# Patient Record
Sex: MALE | Race: WHITE | NOT HISPANIC OR LATINO | Employment: FULL TIME | URBAN - METROPOLITAN AREA
[De-identification: names, ages, dates, MRNs, and addresses within clinical notes are randomized per-mention and may not be internally consistent; named-entity substitution may affect disease eponyms.]

---

## 2017-01-19 ENCOUNTER — NON-PROVIDER VISIT (OUTPATIENT)
Dept: URGENT CARE | Facility: CLINIC | Age: 27
End: 2017-01-19

## 2017-01-19 DIAGNOSIS — Z02.1 PRE-EMPLOYMENT DRUG SCREENING: ICD-10-CM

## 2017-01-19 LAB
AMP AMPHETAMINE: NORMAL
COC COCAINE: NORMAL
INT CON NEG: NORMAL
INT CON POS: NORMAL
OPI OPIATES: NORMAL
PCP PHENCYCLIDINE: NORMAL
POC DRUG COMMENT 753798-OCCUPATIONAL HEALTH: NEGATIVE
THC: NORMAL

## 2017-01-19 PROCEDURE — 80300 POCT 5 PANEL URINE DRUG SCREEN - INSTANT: CPT | Performed by: NURSE PRACTITIONER

## 2018-03-20 ENCOUNTER — OFFICE VISIT (OUTPATIENT)
Dept: URGENT CARE | Facility: PHYSICIAN GROUP | Age: 28
End: 2018-03-20

## 2018-03-20 ENCOUNTER — HOSPITAL ENCOUNTER (OUTPATIENT)
Facility: MEDICAL CENTER | Age: 28
End: 2018-03-20
Attending: EMERGENCY MEDICINE

## 2018-03-20 ENCOUNTER — TELEPHONE (OUTPATIENT)
Dept: URGENT CARE | Facility: PHYSICIAN GROUP | Age: 28
End: 2018-03-20

## 2018-03-20 VITALS
HEART RATE: 76 BPM | TEMPERATURE: 98.4 F | RESPIRATION RATE: 16 BRPM | DIASTOLIC BLOOD PRESSURE: 80 MMHG | SYSTOLIC BLOOD PRESSURE: 122 MMHG | WEIGHT: 230 LBS | OXYGEN SATURATION: 100 %

## 2018-03-20 DIAGNOSIS — R21 RASH: ICD-10-CM

## 2018-03-20 PROCEDURE — 99203 OFFICE O/P NEW LOW 30 MIN: CPT | Performed by: EMERGENCY MEDICINE

## 2018-03-20 RX ORDER — TRIAMCINOLONE ACETONIDE 1 MG/G
1 CREAM TOPICAL 2 TIMES DAILY
Qty: 1 TUBE | Refills: 1 | Status: SHIPPED | OUTPATIENT
Start: 2018-03-20 | End: 2018-11-02

## 2018-03-20 ASSESSMENT — ENCOUNTER SYMPTOMS
MYALGIAS: 0
EYE REDNESS: 0
CHILLS: 0
VOMITING: 0
SENSORY CHANGE: 0
TREMORS: 0
EYE DISCHARGE: 0
TINGLING: 0
NAUSEA: 0
ABDOMINAL PAIN: 0
FEVER: 0

## 2018-03-20 NOTE — PROGRESS NOTES
Subjective:      Kal Cortes is a 28 y.o. male who presents with Rash (On scrotum w/ numbness x 1 day ) and Rash (bi-lateral legs )            HPI  Patient is a pleasant 28-year-old male complaining of right shoulder rash for the past. Rash is very dry scaly patient also has an ongoing rash on his legs and arms that are dry and scaly primarily on his extremities. Patient denies any exposure to STD. His scrotal lesion are not vesicular or weeping but    have dried out his right scrotum.  No Known Allergies  Social History     Social History   • Marital status:      Spouse name: N/A   • Number of children: N/A   • Years of education: N/A     Occupational History   • Not on file.     Social History Main Topics   • Smoking status: Never Smoker   • Smokeless tobacco: Never Used   • Alcohol use Not on file   • Drug use: Unknown   • Sexual activity: Not on file     Other Topics Concern   • Not on file     Social History Narrative   • No narrative on file   History reviewed. No pertinent past medical history..  No current outpatient prescriptions on file prior to visit.     No current facility-administered medications on file prior to visit.    No family history on file.      Review of Systems   Constitutional: Negative for chills and fever.   Eyes: Negative for discharge and redness.   Cardiovascular: Negative for chest pain.   Gastrointestinal: Negative for abdominal pain, nausea and vomiting.   Musculoskeletal: Negative for myalgias.   Skin: Positive for rash.        Patient has a dry scaly rash on the right side of his scrotum approximately 5 x 5 cm no vesicular lesions, there is no repeating with the rash.    Patient also has other 2.2 cm dry scaly lesions on his arms and legs. He's had been present on and off for years.   Neurological: Negative for tingling, tremors and sensory change.          Objective:     /80   Pulse 76   Temp 36.9 °C (98.4 °F)   Resp 16   Wt 104.3 kg (230 lb)   SpO2  100%      Physical Exam   Constitutional: He appears well-developed and well-nourished. No distress.   HENT:   Head: Normocephalic and atraumatic.   Right Ear: External ear normal.   Left Ear: External ear normal.   Eyes: Conjunctivae are normal.   Pulmonary/Chest: Effort normal.   Abdominal: There is no tenderness.   Musculoskeletal: Normal range of motion. He exhibits no edema or tenderness.   Neurological: He is alert.   Skin: Skin is warm and dry. Rash noted. He is not diaphoretic. No erythema.   Patient has a number of lesions on his extremities are dry.  Vesicular. His nose lesion is a 5 cm area thickened scrotal skin with flakiness no vesicles no herpetic lesions. Review   Psychiatric: He has a normal mood and affect. His behavior is normal.   Nursing note and vitals reviewed.              Assessment/Plan:     1. Rash      I have sent off a KOH and I will call the patient he will initiate Kenalog on one side of his body with thin layer placed twice a day to see if he gets any improvement. Given a referral to dermatology which he notices always HAVE recommended he consider using [facility] and that there will be awaiting time which seems to be less than the dermatologist in the local area.  - KOH PREP; Future  - REFERRAL TO DERMATOLOGY  - triamcinolone acetonide (KENALOG) 0.1 % Cream; Apply 1 Application to affected area(s) 2 times a day.  Dispense: 1 Tube; Refill: 1

## 2018-03-21 ENCOUNTER — TELEPHONE (OUTPATIENT)
Dept: URGENT CARE | Facility: PHYSICIAN GROUP | Age: 28
End: 2018-03-21

## 2018-03-21 LAB
KOH PREP SPEC: NORMAL
SIGNIFICANT IND 70042: NORMAL
SITE SITE: NORMAL
SOURCE SOURCE: NORMAL

## 2018-07-12 ENCOUNTER — HOSPITAL ENCOUNTER (OUTPATIENT)
Dept: RADIOLOGY | Facility: MEDICAL CENTER | Age: 28
End: 2018-07-12
Attending: FAMILY MEDICINE
Payer: COMMERCIAL

## 2018-07-12 ENCOUNTER — OFFICE VISIT (OUTPATIENT)
Dept: MEDICAL GROUP | Age: 28
End: 2018-07-12
Payer: COMMERCIAL

## 2018-07-12 VITALS
SYSTOLIC BLOOD PRESSURE: 126 MMHG | HEIGHT: 74 IN | TEMPERATURE: 98.6 F | HEART RATE: 81 BPM | DIASTOLIC BLOOD PRESSURE: 88 MMHG | OXYGEN SATURATION: 94 % | BODY MASS INDEX: 29.39 KG/M2 | WEIGHT: 229 LBS

## 2018-07-12 DIAGNOSIS — E66.3 OVERWEIGHT (BMI 25.0-29.9): ICD-10-CM

## 2018-07-12 DIAGNOSIS — G89.29 CHRONIC LOW BACK PAIN WITH SCIATICA, SCIATICA LATERALITY UNSPECIFIED, UNSPECIFIED BACK PAIN LATERALITY: ICD-10-CM

## 2018-07-12 DIAGNOSIS — M54.40 CHRONIC LOW BACK PAIN WITH SCIATICA, SCIATICA LATERALITY UNSPECIFIED, UNSPECIFIED BACK PAIN LATERALITY: ICD-10-CM

## 2018-07-12 DIAGNOSIS — Z00.00 PREVENTATIVE HEALTH CARE: ICD-10-CM

## 2018-07-12 PROBLEM — M54.9 CHRONIC BACK PAIN: Status: ACTIVE | Noted: 2018-07-12

## 2018-07-12 PROCEDURE — 99204 OFFICE O/P NEW MOD 45 MIN: CPT | Performed by: FAMILY MEDICINE

## 2018-07-12 PROCEDURE — 72100 X-RAY EXAM L-S SPINE 2/3 VWS: CPT

## 2018-07-12 ASSESSMENT — PATIENT HEALTH QUESTIONNAIRE - PHQ9: CLINICAL INTERPRETATION OF PHQ2 SCORE: 0

## 2018-07-12 NOTE — ASSESSMENT & PLAN NOTE
The patient is a 28-year-old male who presents to clinic to establish care, he has significant past medical history of being overweight and chronic back pain.  The patient denied any chest pain, no sob, no biggs, no  pnd, no orthopnea, no headache, no changes in vision, no numbness or tingling, no nausea, no diarrhea, no abdominal pain, no fevers, no chills, no bright red blood per rectum, no  difficulty urinating, no burning during micturition, no depressed mood, no other concerns.    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

## 2018-07-12 NOTE — ASSESSMENT & PLAN NOTE
Patient states he has had low back pain/thoracic back pain since age 13.    He has never had any surgeries, has not had any plain films or MRIs.  He has been using Advil as needed.  He states the last 3 months the pain has become more persistent, 5 out of 10 sharp radiating down his right gluteus rudi and right leg.  The pain improved by laying on flat ground, as well as sitting in a stiff chair, soft Sophos worsen the pain.  He states that when he is driving he needs to use for lumbar support.  He denies any loss of bladder or bowel function no numbness or tingling.

## 2018-07-13 NOTE — ASSESSMENT & PLAN NOTE
Patient knows he can improve with his diet he has difficulty being active due to the back pain.  He can swim however does not have easy access to a swimming pool.

## 2018-07-13 NOTE — PROGRESS NOTES
This medical record contains text that has been entered with the assistance of computer voice recognition and dictation software.  Therefore, it may contain unintended errors in text, spelling, punctuation, or grammar    Chief Complaint   Patient presents with   • Establish Care   • Back Pain     low back pain, x3 months         Kal Cortes is a 28 y.o. male here evaluation and management of: Establish care, back pain, obesity      HPI:     Preventative health care  The patient is a 28-year-old male who presents to clinic to [facility], he has significant past medical history of being overweight and chronic back pain.  The patient denied any chest pain, no sob, no biggs, no  pnd, no orthopnea, no headache, no changes in vision, no numbness or tingling, no nausea, no diarrhea, no abdominal pain, no fevers, no chills, no bright red blood per rectum, no  difficulty urinating, no burning during micturition, no depressed mood, no other concerns.    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    He has never had any surgeries, has not had any plain films or MRIs.  He has been using Advil as needed.  He states the last 3 months the pain has become more persistent, 5 out of 10 sharp radiating down his right gluteus rudi and right leg.  The pain improved by laying on flat ground, as well as sitting in a stiff chair, soft Sophos worsen the pain.  He states that when he is driving he needs to use for lumbar support.  He denies any loss of bladder or bowel function no numbness or tingling.      Overweight (BMI 25.0-29.9)  Patient knows he can improve with his diet he has difficulty being active due to the back pain.  He can  "swim however does not have easy access to a swimming pool.    Current medicines (including changes today)  Current Outpatient Prescriptions   Medication Sig Dispense Refill   • Loratadine-Pseudoephedrine (CLARITIN-D 24 HOUR PO) Take  by mouth.     • triamcinolone acetonide (KENALOG) 0.1 % Cream Apply 1 Application to affected area(s) 2 times a day. 1 Tube 1     No current facility-administered medications for this visit.      He  has no past medical history on file.  He  has no past surgical history on file.  Social History   Substance Use Topics   • Smoking status: Never Smoker   • Smokeless tobacco: Never Used   • Alcohol use No     Social History     Social History Narrative   • No narrative on file     No family history on file.  No family status information on file.         ROS    Please see hpi     All other systems reviewed and are negative     Objective:     Blood pressure 126/88, pulse 81, temperature 37 °C (98.6 °F), height 1.88 m (6' 2\"), weight 103.9 kg (229 lb), SpO2 94 %. Body mass index is 29.4 kg/m².  Physical Exam:    Constitutional: Alert, no distress.  Skin: Warm, dry, good turgor, no rashes in visible areas.  Eye: Equal, round and reactive, conjunctiva clear, lids normal.  ENMT: Lips without lesions, good dentition, oropharynx clear.  Neck: Trachea midline, no masses, no thyromegaly. No cervical or supraclavicular lymphadenopathy.  Respiratory: Unlabored respiratory effort, lungs clear to auscultation, no wheezes, no ronchi.  Cardiovascular: Normal S1, S2, no murmur, no edema.  Abdomen: Soft, non-tender, no masses, no hepatosplenomegaly.  Psych: Alert and oriented x3, normal affect and mood.          Assessment and Plan:   The following treatment plan was discussed      1. Preventative health care    Care has been established  We need baseline labs to establish a clinical profile  We reviewed USPSTF guidelines    Requested Medical records to be sent to us  Denies intimate partner " shonda        2. Chronic low back pain with sciatica, sciatica laterality unspecified, unspecified back pain laterality    Patient informed that overall prognosis of back pain is favorable, he is to use ICE x 2 days, then switch to heat,  Nsaids,  and to ease back into normal activity as quickly as possible,  also to use proper lifting techniques (use legs not back), no clinical indication for imaging. Also counseled patient on low back stretching, hamstring stretching, core strengthening once no longer in acute pain.   - DX-LUMBAR SPINE-2 OR 3 VIEWS; Future  - URINALYSIS,CULTURE IF INDICATED; Future    3. Overweight (BMI 25.0-29.9)      We discussed subsequent randomized trials, weight loss (via lifestyle or pharmacologic therapy) has been shown to reduce morbidity (reduction in risk factors for cardiovascular disease     The frequently cited Diabetes Prevention Program (DPP) significantly reduced the rate of progression from impaired glucose tolerance to diabetes over a three-year period in participants randomized to intensive lifestyle modification focusing on weight loss       We also discussed agressive lifestyle modifications with weight loss, aerobic exercise, and eating a prudent diet, which  included avoiding fried foods, using low-fat milk and avoiding simple carbohydrate, making a food diary. If you can't run because of pain do the stationary bike/elipticle or swim 30minutes 3 times per wk, in the beginning and then gradually increase.    - LIPID PROFILE; Future  - CBC WITHOUT DIFFERENTIAL; Future  - COMP METABOLIC PANEL; Future        HEALTH MAINTENANCE:    Instructed to Follow up in clinic or ER for worsening symptoms, difficulty breathing, lack of expected recovery, or should new symptoms or problems arise.    Followup: Return in about 3 months (around 10/12/2018) for Reevaluation, labs.       Once again this medical record contains text that has been entered with the assistance of computer voice  recognition and dictation software.  Therefore, it may contain unintended errors in text, spelling, punctuation, or grammar

## 2018-07-23 ENCOUNTER — HOSPITAL ENCOUNTER (OUTPATIENT)
Dept: LAB | Facility: MEDICAL CENTER | Age: 28
End: 2018-07-23
Attending: FAMILY MEDICINE
Payer: COMMERCIAL

## 2018-07-23 DIAGNOSIS — G89.29 CHRONIC LOW BACK PAIN WITH SCIATICA, SCIATICA LATERALITY UNSPECIFIED, UNSPECIFIED BACK PAIN LATERALITY: ICD-10-CM

## 2018-07-23 DIAGNOSIS — M54.40 CHRONIC LOW BACK PAIN WITH SCIATICA, SCIATICA LATERALITY UNSPECIFIED, UNSPECIFIED BACK PAIN LATERALITY: ICD-10-CM

## 2018-07-23 DIAGNOSIS — E66.3 OVERWEIGHT (BMI 25.0-29.9): ICD-10-CM

## 2018-07-23 LAB
ALBUMIN SERPL BCP-MCNC: 5 G/DL (ref 3.2–4.9)
ALBUMIN/GLOB SERPL: 1.7 G/DL
ALP SERPL-CCNC: 58 U/L (ref 30–99)
ALT SERPL-CCNC: 29 U/L (ref 2–50)
ANION GAP SERPL CALC-SCNC: 7 MMOL/L (ref 0–11.9)
APPEARANCE UR: CLEAR
AST SERPL-CCNC: 31 U/L (ref 12–45)
BILIRUB SERPL-MCNC: 1 MG/DL (ref 0.1–1.5)
BILIRUB UR QL STRIP.AUTO: NEGATIVE
BUN SERPL-MCNC: 13 MG/DL (ref 8–22)
CALCIUM SERPL-MCNC: 9.9 MG/DL (ref 8.5–10.5)
CHLORIDE SERPL-SCNC: 103 MMOL/L (ref 96–112)
CHOLEST SERPL-MCNC: 151 MG/DL (ref 100–199)
CO2 SERPL-SCNC: 28 MMOL/L (ref 20–33)
COLOR UR: YELLOW
CREAT SERPL-MCNC: 1.02 MG/DL (ref 0.5–1.4)
ERYTHROCYTE [DISTWIDTH] IN BLOOD BY AUTOMATED COUNT: 35.7 FL (ref 35.9–50)
GLOBULIN SER CALC-MCNC: 2.9 G/DL (ref 1.9–3.5)
GLUCOSE SERPL-MCNC: 81 MG/DL (ref 65–99)
GLUCOSE UR STRIP.AUTO-MCNC: NEGATIVE MG/DL
HCT VFR BLD AUTO: 49.7 % (ref 42–52)
HDLC SERPL-MCNC: 38 MG/DL
HGB BLD-MCNC: 17.7 G/DL (ref 14–18)
KETONES UR STRIP.AUTO-MCNC: NEGATIVE MG/DL
LDLC SERPL CALC-MCNC: 75 MG/DL
LEUKOCYTE ESTERASE UR QL STRIP.AUTO: NEGATIVE
MCH RBC QN AUTO: 30.1 PG (ref 27–33)
MCHC RBC AUTO-ENTMCNC: 35.6 G/DL (ref 33.7–35.3)
MCV RBC AUTO: 84.5 FL (ref 81.4–97.8)
MICRO URNS: NORMAL
NITRITE UR QL STRIP.AUTO: NEGATIVE
PH UR STRIP.AUTO: 6 [PH]
PLATELET # BLD AUTO: 226 K/UL (ref 164–446)
PMV BLD AUTO: 10.7 FL (ref 9–12.9)
POTASSIUM SERPL-SCNC: 3.6 MMOL/L (ref 3.6–5.5)
PROT SERPL-MCNC: 7.9 G/DL (ref 6–8.2)
PROT UR QL STRIP: NEGATIVE MG/DL
RBC # BLD AUTO: 5.88 M/UL (ref 4.7–6.1)
RBC UR QL AUTO: NEGATIVE
SODIUM SERPL-SCNC: 138 MMOL/L (ref 135–145)
SP GR UR STRIP.AUTO: 1.01
TRIGL SERPL-MCNC: 192 MG/DL (ref 0–149)
UROBILINOGEN UR STRIP.AUTO-MCNC: 0.2 MG/DL
WBC # BLD AUTO: 6.6 K/UL (ref 4.8–10.8)

## 2018-07-23 PROCEDURE — 80053 COMPREHEN METABOLIC PANEL: CPT

## 2018-07-23 PROCEDURE — 80061 LIPID PANEL: CPT

## 2018-07-23 PROCEDURE — 85027 COMPLETE CBC AUTOMATED: CPT

## 2018-07-23 PROCEDURE — 36415 COLL VENOUS BLD VENIPUNCTURE: CPT

## 2018-07-23 PROCEDURE — 81003 URINALYSIS AUTO W/O SCOPE: CPT

## 2018-08-01 DIAGNOSIS — M48.9 SPONDYLOPATHY: ICD-10-CM

## 2018-08-14 ENCOUNTER — HOSPITAL ENCOUNTER (OUTPATIENT)
Dept: RADIOLOGY | Facility: MEDICAL CENTER | Age: 28
End: 2018-08-14
Attending: PHYSICIAN ASSISTANT
Payer: COMMERCIAL

## 2018-08-14 DIAGNOSIS — M54.5 LOW BACK PAIN, UNSPECIFIED BACK PAIN LATERALITY, UNSPECIFIED CHRONICITY, WITH SCIATICA PRESENCE UNSPECIFIED: ICD-10-CM

## 2018-08-14 DIAGNOSIS — M54.12 RADICULOPATHY, CERVICAL: ICD-10-CM

## 2018-08-14 PROCEDURE — 72050 X-RAY EXAM NECK SPINE 4/5VWS: CPT

## 2018-08-14 PROCEDURE — 72110 X-RAY EXAM L-2 SPINE 4/>VWS: CPT

## 2018-08-23 ENCOUNTER — HOSPITAL ENCOUNTER (OUTPATIENT)
Dept: RADIOLOGY | Facility: MEDICAL CENTER | Age: 28
End: 2018-08-23
Attending: PHYSICIAN ASSISTANT
Payer: COMMERCIAL

## 2018-08-23 DIAGNOSIS — M54.5 LOW BACK PAIN, UNSPECIFIED BACK PAIN LATERALITY, UNSPECIFIED CHRONICITY, WITH SCIATICA PRESENCE UNSPECIFIED: ICD-10-CM

## 2018-08-23 DIAGNOSIS — M54.2 CERVICALGIA: ICD-10-CM

## 2018-08-23 PROCEDURE — 72148 MRI LUMBAR SPINE W/O DYE: CPT

## 2018-08-23 PROCEDURE — 72141 MRI NECK SPINE W/O DYE: CPT

## 2018-11-02 ENCOUNTER — HOSPITAL ENCOUNTER (EMERGENCY)
Facility: MEDICAL CENTER | Age: 28
End: 2018-11-02
Attending: EMERGENCY MEDICINE
Payer: COMMERCIAL

## 2018-11-02 ENCOUNTER — OFFICE VISIT (OUTPATIENT)
Dept: URGENT CARE | Facility: PHYSICIAN GROUP | Age: 28
End: 2018-11-02
Payer: COMMERCIAL

## 2018-11-02 VITALS
DIASTOLIC BLOOD PRESSURE: 89 MMHG | OXYGEN SATURATION: 96 % | BODY MASS INDEX: 29.28 KG/M2 | SYSTOLIC BLOOD PRESSURE: 143 MMHG | WEIGHT: 228.18 LBS | TEMPERATURE: 99.5 F | RESPIRATION RATE: 16 BRPM | HEART RATE: 84 BPM | HEIGHT: 74 IN

## 2018-11-02 VITALS
DIASTOLIC BLOOD PRESSURE: 80 MMHG | OXYGEN SATURATION: 98 % | HEART RATE: 93 BPM | HEIGHT: 75 IN | WEIGHT: 225 LBS | BODY MASS INDEX: 27.98 KG/M2 | RESPIRATION RATE: 18 BRPM | SYSTOLIC BLOOD PRESSURE: 134 MMHG | TEMPERATURE: 101.2 F

## 2018-11-02 DIAGNOSIS — R52 BODY ACHES: ICD-10-CM

## 2018-11-02 DIAGNOSIS — R50.9 FEVER, UNSPECIFIED FEVER CAUSE: ICD-10-CM

## 2018-11-02 DIAGNOSIS — M62.81 MUSCULAR WEAKNESS: ICD-10-CM

## 2018-11-02 DIAGNOSIS — M60.9 MYOSITIS OF MULTIPLE SITES, UNSPECIFIED MYOSITIS TYPE: ICD-10-CM

## 2018-11-02 DIAGNOSIS — M79.10 MYALGIA: ICD-10-CM

## 2018-11-02 DIAGNOSIS — E87.6 HYPOKALEMIA: ICD-10-CM

## 2018-11-02 LAB
ALBUMIN SERPL BCP-MCNC: 4.4 G/DL (ref 3.2–4.9)
ALBUMIN/GLOB SERPL: 1.5 G/DL
ALP SERPL-CCNC: 56 U/L (ref 30–99)
ALT SERPL-CCNC: 37 U/L (ref 2–50)
ANION GAP SERPL CALC-SCNC: 8 MMOL/L (ref 0–11.9)
APPEARANCE UR: CLEAR
AST SERPL-CCNC: 35 U/L (ref 12–45)
BASOPHILS # BLD AUTO: 0.4 % (ref 0–1.8)
BASOPHILS # BLD: 0.02 K/UL (ref 0–0.12)
BILIRUB SERPL-MCNC: 0.8 MG/DL (ref 0.1–1.5)
BILIRUB UR STRIP-MCNC: NORMAL MG/DL
BUN SERPL-MCNC: 17 MG/DL (ref 8–22)
CALCIUM SERPL-MCNC: 8.7 MG/DL (ref 8.4–10.2)
CHLORIDE SERPL-SCNC: 104 MMOL/L (ref 96–112)
CK SERPL-CCNC: 266 U/L (ref 0–154)
CO2 SERPL-SCNC: 25 MMOL/L (ref 20–33)
COLOR UR AUTO: YELLOW
CREAT SERPL-MCNC: 1.08 MG/DL (ref 0.5–1.4)
CRP SERPL HS-MCNC: 2.07 MG/DL (ref 0–0.75)
EOSINOPHIL # BLD AUTO: 0.01 K/UL (ref 0–0.51)
EOSINOPHIL NFR BLD: 0.2 % (ref 0–6.9)
ERYTHROCYTE [DISTWIDTH] IN BLOOD BY AUTOMATED COUNT: 35.1 FL (ref 35.9–50)
ERYTHROCYTE [SEDIMENTATION RATE] IN BLOOD BY WESTERGREN METHOD: 6 MM/HOUR (ref 0–15)
FLUAV+FLUBV AG SPEC QL IA: NEGATIVE
GLOBULIN SER CALC-MCNC: 3 G/DL (ref 1.9–3.5)
GLUCOSE SERPL-MCNC: 96 MG/DL (ref 65–99)
GLUCOSE UR STRIP.AUTO-MCNC: NORMAL MG/DL
HCT VFR BLD AUTO: 46.8 % (ref 42–52)
HETEROPH AB SER QL: NEGATIVE
HGB BLD-MCNC: 16.8 G/DL (ref 14–18)
IMM GRANULOCYTES # BLD AUTO: 0.02 K/UL (ref 0–0.11)
IMM GRANULOCYTES NFR BLD AUTO: 0.4 % (ref 0–0.9)
INT CON NEG: NEGATIVE
INT CON NEG: NEGATIVE
INT CON POS: POSITIVE
INT CON POS: POSITIVE
KETONES UR STRIP.AUTO-MCNC: NORMAL MG/DL
LEUKOCYTE ESTERASE UR QL STRIP.AUTO: NORMAL
LYMPHOCYTES # BLD AUTO: 1.95 K/UL (ref 1–4.8)
LYMPHOCYTES NFR BLD: 36.5 % (ref 22–41)
MCH RBC QN AUTO: 29.7 PG (ref 27–33)
MCHC RBC AUTO-ENTMCNC: 35.9 G/DL (ref 33.7–35.3)
MCV RBC AUTO: 82.8 FL (ref 81.4–97.8)
MONOCYTES # BLD AUTO: 0.62 K/UL (ref 0–0.85)
MONOCYTES NFR BLD AUTO: 11.6 % (ref 0–13.4)
NEUTROPHILS # BLD AUTO: 2.72 K/UL (ref 1.82–7.42)
NEUTROPHILS NFR BLD: 50.9 % (ref 44–72)
NITRITE UR QL STRIP.AUTO: NORMAL
NRBC # BLD AUTO: 0 K/UL
NRBC BLD-RTO: 0 /100 WBC
PH UR STRIP.AUTO: 6 [PH] (ref 5–8)
PLATELET # BLD AUTO: 165 K/UL (ref 164–446)
PMV BLD AUTO: 9.6 FL (ref 9–12.9)
POTASSIUM SERPL-SCNC: 3.3 MMOL/L (ref 3.6–5.5)
PROT SERPL-MCNC: 7.4 G/DL (ref 6–8.2)
PROT UR QL STRIP: NORMAL MG/DL
RBC # BLD AUTO: 5.65 M/UL (ref 4.7–6.1)
RBC UR QL AUTO: NORMAL
S PYO AG THROAT QL: NEGATIVE
SODIUM SERPL-SCNC: 137 MMOL/L (ref 135–145)
SP GR UR STRIP.AUTO: 1.01
TSH SERPL DL<=0.005 MIU/L-ACNC: 2.67 UIU/ML (ref 0.38–5.33)
UROBILINOGEN UR STRIP-MCNC: 0.2 MG/DL
WBC # BLD AUTO: 5.3 K/UL (ref 4.8–10.8)

## 2018-11-02 PROCEDURE — 87040 BLOOD CULTURE FOR BACTERIA: CPT

## 2018-11-02 PROCEDURE — 80053 COMPREHEN METABOLIC PANEL: CPT

## 2018-11-02 PROCEDURE — 86618 LYME DISEASE ANTIBODY: CPT

## 2018-11-02 PROCEDURE — 87804 INFLUENZA ASSAY W/OPTIC: CPT | Performed by: PHYSICIAN ASSISTANT

## 2018-11-02 PROCEDURE — 86431 RHEUMATOID FACTOR QUANT: CPT

## 2018-11-02 PROCEDURE — 99214 OFFICE O/P EST MOD 30 MIN: CPT | Performed by: PHYSICIAN ASSISTANT

## 2018-11-02 PROCEDURE — 81002 URINALYSIS NONAUTO W/O SCOPE: CPT | Performed by: PHYSICIAN ASSISTANT

## 2018-11-02 PROCEDURE — 82550 ASSAY OF CK (CPK): CPT

## 2018-11-02 PROCEDURE — 85652 RBC SED RATE AUTOMATED: CPT

## 2018-11-02 PROCEDURE — 99284 EMERGENCY DEPT VISIT MOD MDM: CPT

## 2018-11-02 PROCEDURE — 84443 ASSAY THYROID STIM HORMONE: CPT

## 2018-11-02 PROCEDURE — 85025 COMPLETE CBC W/AUTO DIFF WBC: CPT

## 2018-11-02 PROCEDURE — 86308 HETEROPHILE ANTIBODY SCREEN: CPT

## 2018-11-02 PROCEDURE — 87880 STREP A ASSAY W/OPTIC: CPT | Performed by: PHYSICIAN ASSISTANT

## 2018-11-02 PROCEDURE — 86140 C-REACTIVE PROTEIN: CPT

## 2018-11-02 PROCEDURE — 36415 COLL VENOUS BLD VENIPUNCTURE: CPT

## 2018-11-02 ASSESSMENT — PAIN SCALES - GENERAL: PAINLEVEL_OUTOF10: 1

## 2018-11-03 LAB — RHEUMATOID FACT SER IA-ACNC: <10 IU/ML (ref 0–14)

## 2018-11-03 NOTE — ED PROVIDER NOTES
"ED Provider Note    CHIEF COMPLAINT  Chief Complaint   Patient presents with   • Generalized Body Aches     x 2 days   • Sent from Urgent Care       HPI  Kal Cortes is a 28 y.o. male with no significant past medical history who presents complaining of weakness and myalgias.    Patient states he awoke 2 days ago and had difficulty using his left hand.  He states it feels weak and stiff.  He has tried to hold objects such as a cup and his phone and has been unable to do so.  Gradually, he states that these same symptoms began affecting the right hand and he now has pain all over his body including his forearms, anterior neck muscles, bilateral hips, upper arms and legs.  He states he feels like his muscles are tight due to swelling but he does not see any external signs of swelling.  He has pain with walking.  Patient states \"it feels like the day after I worked out really hard\" but states he has not exercised in over 2 months secondary to low back pain for which she is undergoing physical therapy.    Patient has taken Tylenol Cold for the symptoms without relief.    Patient denies statin use, trauma, cough, urinary symptoms, chest pain, abdominal pain, weight loss, night sweats, chills, rash, recent travel, tick bites, headache, photophobia, neck stiffness.    Patient was seen at urgent care and told he had a fever of 101.5.  Patient did not receive antipyretics there.  They tested him for flu, strep, and blood in his urine which were all negative.      ALLERGIES  No Known Allergies    CURRENT MEDICATIONS  Tylenol flu and cold    PAST MEDICAL HISTORY   has a past medical history of Chronic back pain.    SURGICAL HISTORY   has a past surgical history that includes appendectomy.    SOCIAL HISTORY  Social History     Social History Main Topics   • Smoking status: Never Smoker   • Smokeless tobacco: Never Used   • Alcohol use No   • Drug use: Yes      Comment: Marijuana   • Sexual activity: Yes     Partners: " "Female       2 children  Patient used marijuana edible's for the first time last night    Family Hx:  Denies history of rheumatoid arthritis, gout, lupus, autoimmune diseases in general      REVIEW OF SYSTEMS  See HPI for further details.  All other systems are negative except as above in HPI.    PHYSICAL EXAM  VITAL SIGNS: /89   Pulse 83   Temp 37.6 °C (99.6 °F)   Resp 17   Ht 1.88 m (6' 2\")   Wt 103.5 kg (228 lb 2.8 oz)   SpO2 97%   BMI 29.30 kg/m²    General:  WDWN, nontoxic appearing in NAD; A+Ox3; V/S as above  Skin: warm and dry; good color; no rash  HEENT: NCAT; EOMs intact; PERRL; no scleral icterus; oropharynx clear  Neck: FROM; no meningismus, no LAD; no thyromegaly  Cardiovascular: Regular heart rate and rhythm.  No murmurs, rubs, or gallops; pulses 2+ bilaterally radially and DP areas  Lungs: Clear to auscultation with good air movement bilaterally.  No wheezes, rhonchi, or rales.   Abdomen: BS present; soft; NTND; no rebound, guarding, or rigidity.  No organomegaly or pulsatile mass  Extremities: MATAMOROS x 4; no e/o trauma; no palpable tenderness; no pedal edema; neg London's  Neurologic: CNs III-XII grossly intact; speech clear; distal sensation intact;  strength 4 out of 5 bilaterally; strength 5/5 UE/LEs; DTRs 2+ bilaterally in BR areas  Psychiatric: Appropriate affect, normal mood    LABS  Results for orders placed or performed during the hospital encounter of 11/02/18   CMP   Result Value Ref Range    Sodium 137 135 - 145 mmol/L    Potassium 3.3 (L) 3.6 - 5.5 mmol/L    Chloride 104 96 - 112 mmol/L    Co2 25 20 - 33 mmol/L    Anion Gap 8.0 0.0 - 11.9    Glucose 96 65 - 99 mg/dL    Bun 17 8 - 22 mg/dL    Creatinine 1.08 0.50 - 1.40 mg/dL    Calcium 8.7 8.4 - 10.2 mg/dL    AST(SGOT) 35 12 - 45 U/L    ALT(SGPT) 37 2 - 50 U/L    Alkaline Phosphatase 56 30 - 99 U/L    Total Bilirubin 0.8 0.1 - 1.5 mg/dL    Albumin 4.4 3.2 - 4.9 g/dL    Total Protein 7.4 6.0 - 8.2 g/dL    Globulin 3.0 " 1.9 - 3.5 g/dL    A-G Ratio 1.5 g/dL   CRP QUANTITIVE (NON-CARDIAC)   Result Value Ref Range    Stat C-Reactive Protein 2.07 (H) 0.00 - 0.75 mg/dL   CREATINE KINASE   Result Value Ref Range    CPK Total 266 (H) 0 - 154 U/L   MONONUCLEOSIS TEST QUAL   Result Value Ref Range    Heterophile Screen Negative Negative   ESTIMATED GFR   Result Value Ref Range    GFR If African American >60 >60 mL/min/1.73 m 2    GFR If Non African American >60 >60 mL/min/1.73 m 2   CBC WITH DIFFERENTIAL   Result Value Ref Range    WBC 5.3 4.8 - 10.8 K/uL    RBC 5.65 4.70 - 6.10 M/uL    Hemoglobin 16.8 14.0 - 18.0 g/dL    Hematocrit 46.8 42.0 - 52.0 %    MCV 82.8 81.4 - 97.8 fL    MCH 29.7 27.0 - 33.0 pg    MCHC 35.9 (H) 33.7 - 35.3 g/dL    RDW 35.1 (L) 35.9 - 50.0 fL    Platelet Count 165 164 - 446 K/uL    MPV 9.6 9.0 - 12.9 fL    Neutrophils-Polys 50.90 44.00 - 72.00 %    Lymphocytes 36.50 22.00 - 41.00 %    Monocytes 11.60 0.00 - 13.40 %    Eosinophils 0.20 0.00 - 6.90 %    Basophils 0.40 0.00 - 1.80 %    Immature Granulocytes 0.40 0.00 - 0.90 %    Nucleated RBC 0.00 /100 WBC    Neutrophils (Absolute) 2.72 1.82 - 7.42 K/uL    Lymphs (Absolute) 1.95 1.00 - 4.80 K/uL    Monos (Absolute) 0.62 0.00 - 0.85 K/uL    Eos (Absolute) 0.01 0.00 - 0.51 K/uL    Baso (Absolute) 0.02 0.00 - 0.12 K/uL    Immature Granulocytes (abs) 0.02 0.00 - 0.11 K/uL    NRBC (Absolute) 0.00 K/uL   WESTERGREN SED RATE   Result Value Ref Range    Sed Rate Westergren 6 0 - 15 mm/hour   TSH   Result Value Ref Range    TSH 2.670 0.380 - 5.330 uIU/mL       MEDICAL RECORD  I have reviewed patient's medical record and pertinent results are listed below.      COURSE & MEDICAL DECISION MAKING  I have reviewed any medical record information, laboratory studies and radiographic results as noted.    Kal Cortes is a 28 y.o. male who presents complaining of myalgias and fever.  He reports pain and tightness with gripping his left hand which has now migrated to the right  and also involves his hips and anterior neck.  No respiratory symptoms.  I do not suspect meningitis.  I do not appreciate weakness on my exam and DTRs are normal.  I do not suspect Guyon Barré or transverse myelitis.  I did discuss the possibility of an autoimmune process with the patient.    Labs demonstrate an elevated CPK of 266 and CRP of 2.07.  Patient has mild hypokalemia of 3.3.  Mono is negative.  Blood cultures, ESR, Lyme titer, and rheumatoid factor are pending.    Pt was re-evaluated at 10:32 PM  Patient was advised about his test results.  I suspect he has a myositis.  This is likely viral in origin.  I have advised him to take Tylenol and/or Motrin.  He should drink plenty of fluids.  I advised him to go to [facility] ER for worsening symptoms which would include increased weakness, difficulty breathing, dark urine, increased pain, or other concerns.  He understands there is a neurologist on-call there should one be needed.  The patient and his wife are amenable to this plan.    Pt's blood pressure was noted to be above 120/80 here in the ER.  Pt was informed and advised to follow up as an outpatient for recheck for possible dx/management of hypertension.    FINAL IMPRESSION  1. Myalgia    2. Myositis of multiple sites, unspecified myositis type    3. Hypokalemia      Electronically signed by: Meggan West, 11/2/2018 7:21 PM

## 2018-11-03 NOTE — DISCHARGE INSTRUCTIONS
Take ibuprofen 600 mg every 6 hours as needed for pain/fever  Take Tylenol 650 mg every 4 hours as needed for pain/fever  Go to [facility] for worsening symptoms as there is a neurologist available there who can be consulted as needed  Follow-up with your primary care provider on Monday for recheck

## 2018-11-03 NOTE — PROGRESS NOTES
Chief Complaint   Patient presents with   • Generalized Body Aches     x 2 days        HISTORY OF PRESENT ILLNESS: Patient is a 28 y.o. male who presents today for about 48 hours of worsening, at this point severe body aches.  Patient states he woke up feeling aching but that has progressed throughout yesterday, into today.  Patient notes in his arms, chest, legs.  He states that he is feeling weakness in his hands to the point he was even having a hard time holding his cell phone earlier today. He was not aware that he had associated fevers until arriving in clinic today.  He states he has not any specific or associated symptoms with the body aches.  No chills, headache (does have neck pain however/felt it was muscle aches), vision changes, nasal congestion, sore throat, cough, nausea, vomiting, diarrhea, abdominal pain, dysuria or rashes.  He denies numbness or tingling.  He has not felt discoordinated or dizzy.     Patient Active Problem List    Diagnosis Date Noted   • Preventative health care 07/12/2018   • Chronic back pain 07/12/2018   • Overweight (BMI 25.0-29.9) 07/12/2018       Allergies:Patient has no known allergies.    Current Outpatient Prescriptions Ordered in [facility]   Medication Sig Dispense Refill   • Loratadine-Pseudoephedrine (CLARITIN-D 24 HOUR PO) Take  by mouth.     • triamcinolone acetonide (KENALOG) 0.1 % Cream Apply 1 Application to affected area(s) 2 times a day. (Patient not taking: Reported on 11/2/2018) 1 Tube 1     No current Epic-ordered facility-administered medications on file.        History reviewed. No pertinent past medical history.    Social History   Substance Use Topics   • Smoking status: Never Smoker   • Smokeless tobacco: Never Used   • Alcohol use No       No family status information on file.   History reviewed. No pertinent family history.    ROS:  Review of Systems   Constitutional:SEE HPI  HENT: Negative for ear pain, nosebleeds, congestion, sore throat and neck pain.   "  Eyes: Negative for blurred vision.   Respiratory: Negative for cough, sputum production, shortness of breath and wheezing.    Cardiovascular: Negative for chest pain, palpitations, orthopnea and leg swelling.   Gastrointestinal: Negative for heartburn, nausea, vomiting and abdominal pain.   Genitourinary: Negative for dysuria, urgency and frequency.     Exam:  Blood pressure 134/80, pulse 93, temperature (!) 38.4 °C (101.2 °F), temperature source Temporal, resp. rate 18, height 1.905 m (6' 3\"), weight 102.1 kg (225 lb), SpO2 98 %.  General:  Well nourished, well developed male in NAD however appears uncomfortable at times.   Eyes: PERRLA, EOM within normal limits, no conjunctival injection, no scleral icterus, visual fields and acuity grossly intact.  Ears: Normal shape and symmetry, no tenderness, no discharge. External canals are without any significant edema or erythema. Tympanic membranes are without any inflammation, no effusion. Gross auditory acuity is intact  Nose: Symmetrical, sinuses without tenderness, no discharge.   Mouth: reasonable hygiene, no erythema exudates or tonsillar enlargement.  Neck: no masses, range of motion within normal limits, no tracheal deviation. No lymphadenopathy  Pulmonary: Normal respiratory effort, no wheezes, crackles, or rhonchi.  Cardiovascular: regular rate and rhythm without murmurs, rubs, or gallops.  Abdomen: Soft, nontender, nondistended. Normal bowel sounds. No hepatosplenomegaly or masses, or hernias. No rebound or guarding.  Skin: No visible rashes or lesion. Warm, pink, dry.   Extremities: no clubbing, cyanosis, or edema.  Neuro: A&O x 3.  CN 2-12 grossly intact.  Motor strength full and equal bilaterally EXCEPT bilateral hand  2/5 bilaterally.  Speech normal/clear.  Normal coordination. Normal gait.   Psych: Normal mood affect        Assessment/Plan:  1. Fever, unspecified fever cause  POCT Urinalysis    POCT Rapid Strep A    POCT Influenza A/B   2. Body " aches     3. Muscular weakness            U/A, Influenza, strep all negative  Patient with severe body aches, new fever,  and objective weakness of bilateral upper extremities warrants further work up.  I am unable to obtain labs or further work up through urgent care and feel he is appropriate for ED at this time.   Patient is here with wife can take him immediately POV to ER.   Patient leaves in stable condition from clinic.   Declines anti-pyretic at this time.       Monique Talley P.A.-C.

## 2018-11-03 NOTE — ED TRIAGE NOTES
"Chief Complaint   Patient presents with   • Generalized Body Aches     x 2 days   • Sent from Urgent Care     /89   Pulse 83   Temp 37.6 °C (99.6 °F)   Resp 17   Ht 1.88 m (6' 2\")   Wt 103.5 kg (228 lb 2.8 oz)   SpO2 98%   BMI 29.30 kg/m²     "

## 2018-11-06 ENCOUNTER — OFFICE VISIT (OUTPATIENT)
Dept: MEDICAL GROUP | Age: 28
End: 2018-11-06
Payer: COMMERCIAL

## 2018-11-06 VITALS
HEIGHT: 75 IN | OXYGEN SATURATION: 97 % | TEMPERATURE: 98.3 F | BODY MASS INDEX: 28.35 KG/M2 | DIASTOLIC BLOOD PRESSURE: 76 MMHG | WEIGHT: 228 LBS | SYSTOLIC BLOOD PRESSURE: 110 MMHG | HEART RATE: 69 BPM

## 2018-11-06 DIAGNOSIS — M79.10 MYALGIA: ICD-10-CM

## 2018-11-06 DIAGNOSIS — R79.82 ELEVATED C-REACTIVE PROTEIN (CRP): ICD-10-CM

## 2018-11-06 DIAGNOSIS — E87.6 HYPOKALEMIA: ICD-10-CM

## 2018-11-06 LAB — B BURGDOR AB SER IA-ACNC: 0.33 LIV (ref 0–1.2)

## 2018-11-06 PROCEDURE — 99204 OFFICE O/P NEW MOD 45 MIN: CPT | Performed by: INTERNAL MEDICINE

## 2018-11-06 RX ORDER — POTASSIUM CHLORIDE 20 MEQ/1
20 TABLET, EXTENDED RELEASE ORAL 2 TIMES DAILY
Qty: 60 TAB | Refills: 0 | Status: SHIPPED | OUTPATIENT
Start: 2018-11-06 | End: 2018-12-18

## 2018-11-07 LAB
BACTERIA BLD CULT: NORMAL
BACTERIA BLD CULT: NORMAL
SIGNIFICANT IND 70042: NORMAL
SIGNIFICANT IND 70042: NORMAL
SITE SITE: NORMAL
SITE SITE: NORMAL
SOURCE SOURCE: NORMAL
SOURCE SOURCE: NORMAL

## 2018-11-07 NOTE — ASSESSMENT & PLAN NOTE
This is a new patient to me.  Patient reported that he started having generalized muscle ache with cramping in nature and weakness at the end of the October 2018 and he went to urgent care on 11/2/18.  He was found to have fever with temperature 101.2°F in urgent care and he was sent to ER for further workup.  He did not have leukocytosis in the blood test done in ER on 11/2/18.  He had negative tests for rapid influenza test, rapid strep test, Lyme disease, heterophil antibody.  He has slightly elevated C-reactive protein, CPK enzyme.  He has normal TSH.  Patient reported that his symptom is mostly affected distal muscles on both legs and both forearms and hands with cramping pain and weakness.  His symptom improved since yesterday.  He has low potassium at 3.3 in ER on 11/2/18.  Patient denied drinking alcohol.  He stated that he ate marijuana CBD 1 dose a few days ago.  He stated that his muscle weakness started before he used marijuana.  He stated that he did not use marijuana anymore.  He also denied using other illicit drugs.

## 2018-11-07 NOTE — PROGRESS NOTES
Kal Cortes is a 28 y.o. male here for evaluation and management of:      HPI:    Myalgia  This is a new patient to me.  Patient reported that he started having generalized muscle ache with cramping in nature and weakness at the end of the October 2018 and he went to urgent care on 11/2/18.  He was found to have fever with temperature 101.2°F in urgent care and he was sent to ER for further workup.  He did not have leukocytosis in the blood test done in ER on 11/2/18.  He had negative tests for rapid influenza test, rapid strep test, Lyme disease, heterophil antibody.  He has slightly elevated C-reactive protein, CPK enzyme.  He has normal TSH.  Patient reported that his symptom is mostly affected distal muscles on both legs and both forearms and hands with cramping pain and weakness.  His symptom improved since yesterday.  He has low potassium at 3.3 in ER on 11/2/18.  Patient denied drinking alcohol.  He stated that he ate marijuana CBD 1 dose a few days ago.  He stated that his muscle weakness started before he used marijuana.  He stated that he did not use marijuana anymore.  He also denied using other illicit drugs.    Current medicines (including changes today)  Current Outpatient Prescriptions   Medication Sig Dispense Refill   • potassium chloride SA (KDUR) 20 MEQ Tab CR Take 1 Tab by mouth 2 times a day. 60 Tab 0   • Loratadine-Pseudoephedrine (CLARITIN-D 24 HOUR PO) Take  by mouth.       No current facility-administered medications for this visit.      He  has a past medical history of Chronic back pain.  He  has a past surgical history that includes appendectomy.  Social History   Substance Use Topics   • Smoking status: Never Smoker   • Smokeless tobacco: Never Used   • Alcohol use No     Social History     Social History Narrative   • No narrative on file     History reviewed. No pertinent family history.  No family status information on file.     Health Maintenance Topics with due status:  "Overdue       Topic Date Due    IMM INFLUENZA 09/01/2018         ROS    Gen.: Denied weight change, appetite change, fatigue.  ENT: Denied sinus tenderness, nasal congestion, runny nose, or sore throat  CVS: Denied chest pain, palpitations, legs swelling.  Respiratory: Denied cough, shortness of breath, wheezing.  GI: Denied abdominal pain, constipation or diarrhea.  Endocrine: Denied temperature intolerance, increased frequency of urination, polyphagia or polydipsia.  Musculoskeletal: Positive for lower back pain.  Positive for leg cramps and weakness on both hands    All other systems reviewed and are negative     Objective:     Blood pressure 110/76, pulse 69, temperature 36.8 °C (98.3 °F), temperature source Temporal, height 1.905 m (6' 3\"), weight 103.4 kg (228 lb), SpO2 97 %. Body mass index is 28.5 kg/m².  Physical Exam:    Constitutional: Well nourished and Well developed, Alert, no distress.  Skin: Warm, dry, good turgor, no rashes in visible areas.  Eye: Equal, round and reactive, conjunctiva clear, lids normal.  ENMT: Lips without lesions, good dentition, oropharynx clear.  Neck: Trachea midline, no masses, no thyromegaly. No cervical or supraclavicular lymphadenopathy.  Respiratory: Unlabored respiratory effort, lungs clear to auscultation, no wheezes, no ronchi.  Cardiovascular: Normal S1, S2, no murmur, no edema. No carotid bruits.  Abdomen: Soft, non distended, non-tender, no masses, no hepatosplenomegaly. Bowel sound normal.  Extremities: No edema, no clubbing, no cyanosis.  Psych: Alert and oriented x3, normal affect and mood.  Musculoskeletal exam: Nontender on palpation of the calves or both upper and lower extremities bilaterally.  Neurological exam: Cranial nerves are grossly intact, muscle strength 5/5 on both upper and lower extremities bilaterally, weak hand  bilaterally, but no signs of muscle atrophy.      Assessment and Plan:   The following treatment plan was discussed       1. " Myalgia  -His symptom is mostly on the distal muscle of extremities and cramping in nature.  I discussed possible causes with patient.  It can be likely due to viral prodrome versus hypokalemia.  However I discussed with patient to do LUCIO with reflex to rule out autoimmune disease.  He has negative ESR, rheumatoid factor.  He has elevated CRP and CPK enzyme.  -I advised patient to repeat blood test in 6-8 weeks for follow-up.  -Patient is advised to eat balanced nutrition, keep well hydration, avoid alcohol and illicit drugs or marijuana.  Patient is advised to take potassium supplement and eat potassium rich diet.  - potassium chloride SA (KDUR) 20 MEQ Tab CR; Take 1 Tab by mouth 2 times a day.  Dispense: 60 Tab; Refill: 0  - CREATINE KINASE; Future  - LUCIO REFLEXIVE PROFILE; Future  - CBC WITH DIFFERENTIAL; Future  - COMP METABOLIC PANEL; Future  - CRP QUANTITIVE (NON-CARDIAC); Future    2. Hypokalemia  -Prescribed potassium chloride 20 mEq 1 tablet twice a day.  Recheck lab in 6-8 weeks.  - potassium chloride SA (KDUR) 20 MEQ Tab CR; Take 1 Tab by mouth 2 times a day.  Dispense: 60 Tab; Refill: 0  - COMP METABOLIC PANEL; Future    3. Elevated C-reactive protein (CRP)  -Repeat blood test in 6-8 weeks and follow-up in clinic.  Advised patient to return to clinic sooner if his symptoms of muscle weakness and pain get worse.  - CREATINE KINASE; Future  - LUCIO REFLEXIVE PROFILE; Future  - CBC WITH DIFFERENTIAL; Future  - COMP METABOLIC PANEL; Future  - CRP QUANTITIVE (NON-CARDIAC); Future      Followup: Return in about 6 weeks (around 12/18/2018), or if symptoms worsen or fail to improve, for Myalgia, hypokalemia, elevated creatinine kinase, Lab review. sooner should new symptoms or problems arise.      Please note that this dictation was created using voice recognition software. I have made every reasonable attempt to correct obvious errors, but I expect that there may have unintended errors in text, spelling,  punctuation, or grammar that I did not discover.

## 2018-12-11 ENCOUNTER — TELEPHONE (OUTPATIENT)
Dept: MEDICAL GROUP | Age: 28
End: 2018-12-11

## 2018-12-11 NOTE — TELEPHONE ENCOUNTER
1. Caller Name: Kal Cortes                                           Call Back Number: [phone] (home)         Patient approves a detailed voicemail message: yes    Pt was referred to [facility] for [facility], who then [facility].  Pt has been seeing Ap Rowe, who is recommending Pt get an a-ray of his right hip and pelvis due to some very acute pain making it difficult to complete Physical Therapy or sleep at night.    Ap Rowe can be reached at: [phone].    Pt would like x-ray completed prior to 12/18/18 appointment with PCP as he is in a lot of pain.

## 2018-12-12 DIAGNOSIS — M25.551 PAIN OF RIGHT HIP JOINT: ICD-10-CM

## 2018-12-17 ENCOUNTER — HOSPITAL ENCOUNTER (OUTPATIENT)
Dept: RADIOLOGY | Facility: MEDICAL CENTER | Age: 28
End: 2018-12-17
Attending: FAMILY MEDICINE
Payer: COMMERCIAL

## 2018-12-17 DIAGNOSIS — M25.551 PAIN OF RIGHT HIP JOINT: ICD-10-CM

## 2018-12-17 PROCEDURE — 73522 X-RAY EXAM HIPS BI 3-4 VIEWS: CPT

## 2018-12-18 ENCOUNTER — OFFICE VISIT (OUTPATIENT)
Dept: MEDICAL GROUP | Age: 28
End: 2018-12-18
Payer: COMMERCIAL

## 2018-12-18 VITALS
WEIGHT: 230 LBS | BODY MASS INDEX: 28.6 KG/M2 | DIASTOLIC BLOOD PRESSURE: 66 MMHG | SYSTOLIC BLOOD PRESSURE: 104 MMHG | TEMPERATURE: 98.1 F | HEART RATE: 61 BPM | HEIGHT: 75 IN | OXYGEN SATURATION: 95 %

## 2018-12-18 DIAGNOSIS — M25.551 PAIN OF RIGHT HIP JOINT: ICD-10-CM

## 2018-12-18 DIAGNOSIS — Z23 NEED FOR VACCINATION: ICD-10-CM

## 2018-12-18 PROCEDURE — 99214 OFFICE O/P EST MOD 30 MIN: CPT | Mod: 25 | Performed by: FAMILY MEDICINE

## 2018-12-18 PROCEDURE — 90686 IIV4 VACC NO PRSV 0.5 ML IM: CPT | Performed by: FAMILY MEDICINE

## 2018-12-18 PROCEDURE — 90471 IMMUNIZATION ADMIN: CPT | Performed by: FAMILY MEDICINE

## 2018-12-18 RX ORDER — MELOXICAM 15 MG/1
15 TABLET ORAL DAILY
Qty: 60 TAB | Refills: 0 | Status: SHIPPED | OUTPATIENT
Start: 2018-12-18 | End: 2019-04-23

## 2018-12-18 NOTE — PROGRESS NOTES
I discussed results and plan with patient, who stated understanding.       Marcus Miguel MD  [employer], [address] [address]

## 2018-12-18 NOTE — PROGRESS NOTES
This medical record contains text that has been entered with the assistance of computer voice recognition and dictation software.  Therefore, it may contain unintended errors in text, spelling, punctuation, or grammar    Chief Complaint   Patient presents with   • Hip Pain   • Results     imaging results          Kal Cortes is a 28 y.o. male here evaluation and management of:right hip pain      HPI:     Pain of right hip joint  NEW PROBLEM    Patient has been complaining of right hip pain for the past couple of months or so.  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  Patient states the pain is sharp, 4 out of 10, radiates down his right thigh, sometimes he falls down because the pain is so severe.  It prevents him from sleeping at night.  He has been taking over-the-counter anti-inflammatories.  The pain is exacerbated by making certain stops of the right foot planting and changing direction while walking.  Pain improves with time and rest however this is affecting his exercising ability.  Denies any popping locking he did speak of the instability at times though.  There has been no trauma per patient.    Current medicines (including changes today)  Current Outpatient Prescriptions   Medication Sig Dispense Refill   • meloxicam (MOBIC) 15 MG tablet Take 1 Tab by mouth every day. 60 Tab 0   • Loratadine-Pseudoephedrine (CLARITIN-D 24 HOUR PO) Take  by mouth.       No current facility-administered medications for this visit.      He  has a past medical history of Chronic back pain.  He  has a past surgical history that includes appendectomy.  Social History   Substance Use Topics   • Smoking status: Never Smoker   • Smokeless tobacco: Never Used   • Alcohol use No     Social History     Social History Narrative   • No narrative on file     No family history on file.  No family status information on file.  "        ROS    Please see hpi     All other systems reviewed and are negative     Objective:     Blood pressure 104/66, pulse 61, temperature 36.7 °C (98.1 °F), temperature source Temporal, height 1.905 m (6' 3\"), weight 104.3 kg (230 lb), SpO2 95 %. Body mass index is 28.75 kg/m².  Physical Exam:    Constitutional: Alert, no distress.  Skin: Warm, dry, good turgor, no rashes in visible areas.  Eye: Equal, round and reactive, conjunctiva clear, lids normal.  ENMT: Lips without lesions, good dentition, oropharynx clear.  Neck: Trachea midline, no masses, no thyromegaly. No cervical or supraclavicular lymphadenopathy.  Respiratory: Unlabored respiratory effort, lungs clear to auscultation, no wheezes, no ronchi.  Cardiovascular: Normal S1, S2, no murmur, no edema.  Abdomen: Soft, non-tender, no masses, no hepatosplenomegaly.  Psych: Alert and oriented x3, normal affect and mood.    HIP EXAM:  NO asymmetry of the pelvis, thighs, and knees  NO Swelling, heat, and overlying erythema   NO atrophy  Normal range of motion of the hip  NO muscle spasm  NO arthritis include pain at night, bone pain distant from the joint  Cannot jump on one leg for fear of pain  POSITIVE FADIR, mildly positive FADER          Assessment and Plan:   The following treatment plan was discussed      1. Need for vaccination    Vaccine was administered today without adverse event.    - Influenza Vaccine Quad Injection >3Y (PF)    2. Pain of right hip joint    Plain film was unremarkable  Since the pain persists and appears to be severe  We will proceed with MRI of the hip  For now Mobic prn    - MR-HIP-W/O RIGHT; Future  - meloxicam (MOBIC) 15 MG tablet; Take 1 Tab by mouth every day.  Dispense: 60 Tab; Refill: 0        HEALTH MAINTENANCE:    Instructed to Follow up in clinic or ER for worsening symptoms, difficulty breathing, lack of expected recovery, or should new symptoms or problems arise.    Followup: Return in about 4 weeks (around 1/15/2019) " for Reevaluation.       Once again this medical record contains text that has been entered with the assistance of computer voice recognition and dictation software.  Therefore, it may contain unintended errors in text, spelling, punctuation, or grammar

## 2018-12-18 NOTE — ASSESSMENT & PLAN NOTE
NEW PROBLEM    Patient has been complaining of right hip pain for the past couple of months or so.  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  Patient states the pain is sharp, 4 out of 10, radiates down his right thigh, sometimes he falls down because the pain is so severe.  It prevents him from sleeping at night.  He has been taking over-the-counter anti-inflammatories.  The pain is exacerbated by making certain stops of the right foot planting and changing direction while walking.  Pain improves with time and rest however this is affecting his exercising ability.  Denies any popping locking he did speak of the instability at times though.  There has been no trauma per patient.

## 2018-12-20 ENCOUNTER — HOSPITAL ENCOUNTER (OUTPATIENT)
Dept: RADIOLOGY | Facility: MEDICAL CENTER | Age: 28
End: 2018-12-20
Attending: FAMILY MEDICINE
Payer: COMMERCIAL

## 2018-12-20 DIAGNOSIS — M25.859 FEMOROACETABULAR IMPINGEMENT: ICD-10-CM

## 2018-12-20 DIAGNOSIS — M25.551 PAIN OF RIGHT HIP JOINT: ICD-10-CM

## 2018-12-20 PROCEDURE — 73721 MRI JNT OF LWR EXTRE W/O DYE: CPT | Mod: RT

## 2018-12-26 ENCOUNTER — TELEPHONE (OUTPATIENT)
Dept: MEDICAL GROUP | Age: 28
End: 2018-12-26

## 2018-12-26 NOTE — TELEPHONE ENCOUNTER
Patient requests change in pharmacy. Chart updated and prescription (MOBIC) called into   [id number] - [place] - [address]  [address] [place] [address]  Phone: [phone] Fax: [phone]

## 2019-01-15 ENCOUNTER — TELEPHONE (OUTPATIENT)
Dept: MEDICAL GROUP | Age: 29
End: 2019-01-15

## 2019-01-15 NOTE — TELEPHONE ENCOUNTER
"1. Caller Name: [facility]                                         Call Back Number: [phone]      Patient approves a detailed voicemail message: N\A    Spoke with MA.  Pt needs an \"intrarticular injection\" in his hip.  Orders in Media      Please advise   "

## 2019-01-15 NOTE — TELEPHONE ENCOUNTER
"1. Caller Name: Kal Cortes                                         Call Back Number: [phone] (home)         Patient approves a detailed voicemail message: N\A    Pt was referred to [facility] for hip pain.  Ortho referred him back to [facility] as he needs a \"cortisone injection with dye\".  "

## 2019-01-16 ENCOUNTER — PATIENT MESSAGE (OUTPATIENT)
Dept: MEDICAL GROUP | Age: 29
End: 2019-01-16

## 2019-01-16 NOTE — PATIENT COMMUNICATION
1. Caller Name: Kal Cortes                                           Call Back Number: [phone] (home)         Patient approves a detailed voicemail message: N\A    Spoke with Pt- Pt scheduled for injection on 1/18/19.

## 2019-01-18 ENCOUNTER — HOSPITAL ENCOUNTER (OUTPATIENT)
Dept: RADIOLOGY | Facility: MEDICAL CENTER | Age: 29
End: 2019-01-18
Attending: ORTHOPAEDIC SURGERY
Payer: COMMERCIAL

## 2019-01-18 DIAGNOSIS — M25.551 RIGHT HIP PAIN: ICD-10-CM

## 2019-01-18 PROCEDURE — 700101 HCHG RX REV CODE 250

## 2019-01-18 PROCEDURE — 700111 HCHG RX REV CODE 636 W/ 250 OVERRIDE (IP)

## 2019-01-18 PROCEDURE — 20610 DRAIN/INJ JOINT/BURSA W/O US: CPT | Mod: RT

## 2019-01-18 PROCEDURE — 77002 NEEDLE LOCALIZATION BY XRAY: CPT

## 2019-01-18 PROCEDURE — 700117 HCHG RX CONTRAST REV CODE 255: Performed by: ORTHOPAEDIC SURGERY

## 2019-01-18 RX ORDER — BUPIVACAINE HYDROCHLORIDE 5 MG/ML
INJECTION, SOLUTION EPIDURAL; INTRACAUDAL
Status: DISPENSED
Start: 2019-01-18 | End: 2019-01-19

## 2019-01-18 RX ORDER — TRIAMCINOLONE ACETONIDE 40 MG/ML
INJECTION, SUSPENSION INTRA-ARTICULAR; INTRAMUSCULAR
Status: DISPENSED
Start: 2019-01-18 | End: 2019-01-19

## 2019-01-18 RX ORDER — LIDOCAINE HYDROCHLORIDE 10 MG/ML
INJECTION, SOLUTION INFILTRATION; PERINEURAL
Status: DISPENSED
Start: 2019-01-18 | End: 2019-01-19

## 2019-01-18 RX ADMIN — IOHEXOL 5 ML: 300 INJECTION, SOLUTION INTRAVENOUS at 13:15

## 2019-01-22 DIAGNOSIS — M54.40 ACUTE RIGHT-SIDED LOW BACK PAIN WITH SCIATICA, SCIATICA LATERALITY UNSPECIFIED: ICD-10-CM

## 2019-03-17 ENCOUNTER — OFFICE VISIT (OUTPATIENT)
Dept: URGENT CARE | Facility: PHYSICIAN GROUP | Age: 29
End: 2019-03-17
Payer: COMMERCIAL

## 2019-03-17 VITALS
BODY MASS INDEX: 28.23 KG/M2 | WEIGHT: 220 LBS | RESPIRATION RATE: 16 BRPM | TEMPERATURE: 98.4 F | SYSTOLIC BLOOD PRESSURE: 128 MMHG | HEIGHT: 74 IN | DIASTOLIC BLOOD PRESSURE: 78 MMHG | HEART RATE: 90 BPM | OXYGEN SATURATION: 95 %

## 2019-03-17 DIAGNOSIS — L30.9 DERMATITIS: ICD-10-CM

## 2019-03-17 LAB
APPEARANCE UR: CLEAR
BILIRUB UR STRIP-MCNC: NEGATIVE MG/DL
COLOR UR AUTO: YELLOW
GLUCOSE UR STRIP.AUTO-MCNC: NEGATIVE MG/DL
KETONES UR STRIP.AUTO-MCNC: NEGATIVE MG/DL
LEUKOCYTE ESTERASE UR QL STRIP.AUTO: NEGATIVE
NITRITE UR QL STRIP.AUTO: NEGATIVE
PH UR STRIP.AUTO: 6 [PH] (ref 5–8)
PROT UR QL STRIP: NEGATIVE MG/DL
RBC UR QL AUTO: NEGATIVE
SP GR UR STRIP.AUTO: 1.01
UROBILINOGEN UR STRIP-MCNC: 0.2 MG/DL

## 2019-03-17 PROCEDURE — 99214 OFFICE O/P EST MOD 30 MIN: CPT | Performed by: PHYSICIAN ASSISTANT

## 2019-03-17 PROCEDURE — 81002 URINALYSIS NONAUTO W/O SCOPE: CPT | Performed by: PHYSICIAN ASSISTANT

## 2019-03-17 RX ORDER — METHYLPREDNISOLONE 4 MG/1
TABLET ORAL
Qty: 1 KIT | Refills: 0 | Status: SHIPPED | OUTPATIENT
Start: 2019-03-17 | End: 2019-04-23

## 2019-03-17 ASSESSMENT — ENCOUNTER SYMPTOMS
SORE THROAT: 0
FATIGUE: 0
EYE PAIN: 0
COUGH: 0
DIARRHEA: 0
FEVER: 0
NAIL CHANGES: 0
RHINORRHEA: 0
SHORTNESS OF BREATH: 0
ANOREXIA: 0
VOMITING: 0

## 2019-03-17 NOTE — PROGRESS NOTES
"Subjective:      Kal Cortes is a 29 y.o. male who presents with Rash (Bilateral rash on both legs x 1 week)            Patient denies any travel, start of any medication, fevers, chills, nauseous or vomiting.  He states his rash is itchy.  Hydrocortisone cream has not helped.  Benadryl has not helped      Rash   This is a new problem. The current episode started in the past 7 days. Progression since onset: bilateral calfs. The rash is characterized by redness and itchiness. He was exposed to nothing. Pertinent negatives include no anorexia, congestion, cough, diarrhea, eye pain, facial edema, fatigue, fever, joint pain, nail changes, rhinorrhea, shortness of breath, sore throat or vomiting. Past treatments include nothing. The treatment provided no relief. There is no history of allergies, asthma or eczema.       Review of Systems   Constitutional: Negative for fatigue and fever.   HENT: Negative for congestion, rhinorrhea and sore throat.    Eyes: Negative for pain.   Respiratory: Negative for cough and shortness of breath.    Gastrointestinal: Negative for anorexia, diarrhea and vomiting.   Musculoskeletal: Negative for joint pain.   Skin: Positive for rash. Negative for nail changes.          Objective:     /78   Pulse 90   Temp 36.9 °C (98.4 °F)   Resp 16   Ht 1.88 m (6' 2\")   Wt 99.8 kg (220 lb)   SpO2 95%   BMI 28.25 kg/m²      Physical Exam   Constitutional: He is oriented to person, place, and time. He appears well-developed and well-nourished.   HENT:   Head: Normocephalic and atraumatic.   Eyes: Pupils are equal, round, and reactive to light. EOM are normal.   Neck: Normal range of motion. Neck supple.   Cardiovascular: Normal rate, regular rhythm and normal heart sounds.    Pulmonary/Chest: Effort normal and breath sounds normal.   Abdominal: Soft.   Neurological: He is alert and oriented to person, place, and time.   Skin: Skin is dry. Capillary refill takes less than 2 seconds.  "        Urgent CARE course patient has a red macular rash on the medial aspects of both calves.  It is non-blanchable.  There is no papular nature to it or vesicular nature to it.          Urgent CARE course: Urinalysis was done and was unremarkable.  There is no blood or protein in it     Assessment/Plan:     1. Dermatitis  Unclear etiology.  Getting a CBC.  May be autoimmune versus post viral versus other etiology.  Did put in referral to dermatology  - REFERRAL TO DERMATOLOGY  - CBC WITH DIFFERENTIAL; Future  - POCT Urinalysis  - MethylPREDNISolone (MEDROL DOSEPAK) 4 MG Tablet Therapy Pack; Take daily according to directions on the packet  Dispense: 1 Kit; Refill: 0

## 2019-04-23 ENCOUNTER — OFFICE VISIT (OUTPATIENT)
Dept: MEDICAL GROUP | Age: 29
End: 2019-04-23
Payer: COMMERCIAL

## 2019-04-23 VITALS
DIASTOLIC BLOOD PRESSURE: 74 MMHG | HEART RATE: 73 BPM | WEIGHT: 223.8 LBS | TEMPERATURE: 98.2 F | OXYGEN SATURATION: 96 % | SYSTOLIC BLOOD PRESSURE: 130 MMHG | BODY MASS INDEX: 28.72 KG/M2 | HEIGHT: 74 IN

## 2019-04-23 DIAGNOSIS — M79.10 MYALGIA: ICD-10-CM

## 2019-04-23 DIAGNOSIS — Z00.00 ANNUAL PHYSICAL EXAM: ICD-10-CM

## 2019-04-23 DIAGNOSIS — N50.819 TESTICULAR PAIN: ICD-10-CM

## 2019-04-23 DIAGNOSIS — M25.551 PAIN OF RIGHT HIP JOINT: ICD-10-CM

## 2019-04-23 DIAGNOSIS — M25.851 FEMOROACETABULAR IMPINGEMENT OF RIGHT HIP: ICD-10-CM

## 2019-04-23 PROBLEM — M25.559 ARTHRALGIA OF HIP: Status: ACTIVE | Noted: 2018-12-18

## 2019-04-23 PROCEDURE — 99214 OFFICE O/P EST MOD 30 MIN: CPT | Performed by: FAMILY MEDICINE

## 2019-04-23 RX ORDER — DULOXETIN HYDROCHLORIDE 20 MG/1
20 CAPSULE, DELAYED RELEASE ORAL DAILY
Qty: 90 CAP | Refills: 0 | Status: SHIPPED | OUTPATIENT
Start: 2019-04-23

## 2019-04-23 ASSESSMENT — PAIN SCALES - GENERAL: PAINLEVEL: 7=MODERATE-SEVERE PAIN

## 2019-04-23 ASSESSMENT — PATIENT HEALTH QUESTIONNAIRE - PHQ9: CLINICAL INTERPRETATION OF PHQ2 SCORE: 0

## 2019-04-23 NOTE — ASSESSMENT & PLAN NOTE
NEW PROBLEM    Patient states that in addition to multi-joint pain muscle pain.  He is now complaining of bilateral testicular pain.  He states that feels like someone is kicking him in the groin which has not happened.  No urethral discharge no fevers no chills no night sweats.

## 2019-04-23 NOTE — PROGRESS NOTES
This medical record contains text that has been entered with the assistance of computer voice recognition and dictation software.  Therefore, it may contain unintended errors in text, spelling, punctuation, or grammar    Chief Complaint   Patient presents with   • Pain     all over body, spine, uncomfortable/ testicle pain left side         Kal Cortes is a 29 y.o. male here evaluation and management of: body pain      HPI:     Femoroacetabular impingement of right hip  Patient states he continues to have pain not only localized to the right hip but this throughout his body.  He states that he is tried physical therapy is tried gabapentin tried anti-inflammatories but they have not helped at all.  He has been seen [facility] but he states that nothing was done.    Myalgia  Patient continues to complain of diffuse body aches.  He states that he can have pain in his bilateral lower extremities including calf thighs hips upper extremities back pain.  He has had several lab tests order as well as MRIs.  He has been seeing orthopedics and continues to complain of pain.  Patient states that this is affecting him at work as well.  He has had back pain since age 13.  He states that he was given many medications in the past with no benefit including gabapentin which made his thinking off.    Testicular pain  NEW PROBLEM    Patient states that in addition to multi-joint pain muscle pain.  He is now complaining of bilateral testicular pain.  He states that feels like someone is kicking him in the groin which has not happened.  No urethral discharge no fevers no chills no night sweats.    Current medicines (including changes today)  Current Outpatient Prescriptions   Medication Sig Dispense Refill   • DULoxetine (CYMBALTA) 20 MG Cap DR Particles Take 1 Cap by mouth every day. 90 Cap 0   • Loratadine-Pseudoephedrine (CLARITIN-D 24 HOUR PO) Take  by mouth.       No current facility-administered  "medications for this visit.      He  has a past medical history of Chronic back pain.  He  has a past surgical history that includes appendectomy.  Social History   Substance Use Topics   • Smoking status: Never Smoker   • Smokeless tobacco: Never Used   • Alcohol use No     Social History     Social History Narrative   • No narrative on file     No family history on file.  No family status information on file.         ROS    Please see hpi     All other systems reviewed and are negative     Objective:     /74   Pulse 73   Temp 36.8 °C (98.2 °F) (Temporal)   Ht 1.88 m (6' 2\")   Wt 101.5 kg (223 lb 12.8 oz)   SpO2 96%  Body mass index is 28.73 kg/m².  Physical Exam:    Constitutional: Alert, no distress.  Skin: Warm, dry, good turgor, no rashes in visible areas  Eye: Equal, round and reactive, conjunctiva clear, lids normal.  ENMT: Lips without lesions, good dentition, oropharynx clear.  Neck: Trachea midline, no masses, no thyromegaly. No cervical or supraclavicular lymphadenopathy.  Respiratory: Unlabored respiratory effort, lungs clear to auscultation, no wheezes, no ronchi.  Cardiovascular: Normal S1, S2, no murmur, no edema  Abdomen: Soft, non-tender, no masses, no hepatosplenomegaly.  Psych: Alert and oriented x3, normal affect and mood.          Assessment and Plan:   The following treatment plan was discussed      1. Pain of right hip joint   I will refer to PMR  As we have not had success in alleviateing symptoms  In the meantime start duloxetine    - LUCIO REFLEXIVE PROFILE; Future    2. Femoroacetabular impingement of right hip    See #1    - REFERRAL TO PHYSIATRY (PMR)    3. Annual physical exam    We will obtain new labs to update clinical profile.  Then we will adjust therapy as needed.    - Lipid Profile; Future  - CBC WITHOUT DIFFERENTIAL; Future  - Comp Metabolic Panel; Future    4. Testicular pain    Obtain US  And start cymbalta  While awaiting PMR consult    - [id number]; " Future  - DULoxetine (CYMBALTA) 20 MG Cap DR Particles; Take 1 Cap by mouth every day.  Dispense: 90 Cap; Refill: 0    5. Myalgia  See above  fibromyalgia is high on differential      Instructed to Follow up in clinic or ER for worsening symptoms, difficulty breathing, lack of expected recovery, or should new symptoms or problems arise.    Followup: Return in about 4 weeks (around 5/21/2019) for Reevaluation.       Once again this medical record contains text that has been entered with the assistance of computer voice recognition and dictation software.  Therefore, it may contain unintended errors in text, spelling, punctuation, or grammar

## 2019-04-23 NOTE — ASSESSMENT & PLAN NOTE
Patient states he continues to have pain not only localized to the right hip but this throughout his body.  He states that he is tried physical therapy is tried gabapentin tried anti-inflammatories but they have not helped at all.  He has been seen [facility] but he states that nothing was done.

## 2019-04-23 NOTE — ASSESSMENT & PLAN NOTE
Patient continues to complain of diffuse body aches.  He states that he can have pain in his bilateral lower extremities including calf thighs hips upper extremities back pain.  He has had several lab tests order as well as MRIs.  He has been seeing orthopedics and continues to complain of pain.  Patient states that this is affecting him at work as well.  He has had back pain since age 13.  He states that he was given many medications in the past with no benefit including gabapentin which made his thinking off.

## 2019-04-25 ENCOUNTER — HOSPITAL ENCOUNTER (OUTPATIENT)
Dept: RADIOLOGY | Facility: MEDICAL CENTER | Age: 29
End: 2019-04-25
Attending: FAMILY MEDICINE
Payer: COMMERCIAL

## 2019-04-25 DIAGNOSIS — N50.819 TESTICULAR PAIN: ICD-10-CM

## 2019-04-25 PROCEDURE — 76870 US EXAM SCROTUM: CPT

## 2019-05-29 ENCOUNTER — TELEPHONE (OUTPATIENT)
Dept: MEDICAL GROUP | Age: 29
End: 2019-05-29

## 2019-05-29 ENCOUNTER — OFFICE VISIT (OUTPATIENT)
Dept: PHYSICAL MEDICINE AND REHAB | Facility: MEDICAL CENTER | Age: 29
End: 2019-05-29
Payer: COMMERCIAL

## 2019-05-29 ENCOUNTER — TELEPHONE (OUTPATIENT)
Dept: PHYSICAL MEDICINE AND REHAB | Facility: MEDICAL CENTER | Age: 29
End: 2019-05-29

## 2019-05-29 VITALS
DIASTOLIC BLOOD PRESSURE: 78 MMHG | BODY MASS INDEX: 28.29 KG/M2 | HEIGHT: 74 IN | SYSTOLIC BLOOD PRESSURE: 118 MMHG | HEART RATE: 91 BPM | TEMPERATURE: 97.7 F | OXYGEN SATURATION: 100 % | WEIGHT: 220.46 LBS

## 2019-05-29 DIAGNOSIS — M43.16 SPONDYLOLISTHESIS OF LUMBAR REGION: ICD-10-CM

## 2019-05-29 DIAGNOSIS — G89.29 CHRONIC BILATERAL LOW BACK PAIN WITH SCIATICA, SCIATICA LATERALITY UNSPECIFIED: ICD-10-CM

## 2019-05-29 DIAGNOSIS — M47.816 LUMBAR SPONDYLOSIS: ICD-10-CM

## 2019-05-29 DIAGNOSIS — M25.851 FEMOROACETABULAR IMPINGEMENT OF RIGHT HIP: ICD-10-CM

## 2019-05-29 DIAGNOSIS — M47.816 FACET ARTHROPATHY, LUMBAR: ICD-10-CM

## 2019-05-29 DIAGNOSIS — M54.16 LUMBAR RADICULOPATHY: ICD-10-CM

## 2019-05-29 DIAGNOSIS — M54.40 CHRONIC BILATERAL LOW BACK PAIN WITH SCIATICA, SCIATICA LATERALITY UNSPECIFIED: ICD-10-CM

## 2019-05-29 PROCEDURE — 99205 OFFICE O/P NEW HI 60 MIN: CPT | Performed by: PHYSICAL MEDICINE & REHABILITATION

## 2019-05-29 ASSESSMENT — PAIN SCALES - GENERAL: PAINLEVEL: 10=SEVERE PAIN

## 2019-05-29 ASSESSMENT — PATIENT HEALTH QUESTIONNAIRE - PHQ9: CLINICAL INTERPRETATION OF PHQ2 SCORE: 0

## 2019-05-29 NOTE — TELEPHONE ENCOUNTER
1. Caller Name: [facility]         Call Back Number: n/a      Patient approves a detailed voicemail message: N\A    2. SPECIFIC Action To Be Taken: Referral pending, please sign.    3. Diagnosis/Clinical Reason for Request: M25.851) Femoroacetabular impingement of right hip    4. Specialty & Provider Name/Lab/Imaging Location: Dr. Peter Thao    5. Is appointment scheduled for requested order/referral: no    Patient was informed they will receive a return phone call from the office ONLY if there are any questions before processing their request. Advised to call back if they haven't received a call from the referral department in 5 days.

## 2019-05-29 NOTE — PROGRESS NOTES
New patient note    Physiatry (physical medicine and  Rehabilitation), interventional spine and sports medicine    Date of Service: 5/29/2019    Chief complaint:   Chief Complaint   Patient presents with   • New Patient     Hip pain        HISTORY    HPI: Kal Cortes 29 y.o. male who presents today with Diagnoses of Lumbar radiculopathy, Spondylolisthesis of lumbar region, Facet arthropathy, lumbar, Lumbar spondylosis, and Femoroacetabular impingement of right hip were pertinent to this visit.    Chronic low back pain, worse over the past year, started after lifting a bench. Had acute pain at that time. Radiated down the bilateral legs right worse than left on the lateral aspect down to the foot. Tried seeing an orthopedist. Tried medical management with several medications including NSAIDs and gabapentin. Back pain and radicular pain are improving with PT. Has difficulty working around the house including picking up his children because of the pain. Patient reports failing and epidural.     Chronic right hip/groin pain worse in the groin, intermittent, sharp and stabbing pain when active. The worst pain is with quick movements of the hip. S/p injection with hip MRI, with the hip injection the patient had hip pain resolved for one hour.     Could not tolerate gabapentin because of side effects with fatigue and depression.  Also tried multiple topicals including lidocaine.       Medical records review:  I reviewed the note from the referring provider Cody Gardner* dated 4/23/2019.  Femoral acetabular impingement syndrome, tried physical therapist and anti-inflammatory as well as gabapentin but still had significant amounts of pain.  Diffuse body aches.  Chronic pain since age 13.  Duloxetine was started at that visit as well.  Ultrasound of the scrotum was ordered for testicular pain      ROS:   Wears glasses  Red Flags ROS:   Fever, Chills, Sweats: Denies  Involuntary Weight Loss:  Denies  Bladder Incontinence: Denies  Bowel Incontinence: denies  Saddle Anesthesia: Denies    All other systems reviewed and negative.       PMHx:   Past Medical History:   Diagnosis Date   • Chronic back pain          Current Outpatient Prescriptions on File Prior to Visit   Medication Sig Dispense Refill   • DULoxetine (CYMBALTA) 20 MG Cap DR Particles Take 1 Cap by mouth every day. (Patient not taking: Reported on 5/29/2019) 90 Cap 0   • Loratadine-Pseudoephedrine (CLARITIN-D 24 HOUR PO) Take  by mouth.       No current facility-administered medications on file prior to visit.         PSHx:   Past Surgical History:   Procedure Laterality Date   • APPENDECTOMY         Family history   History reviewed. No pertinent family history.      Medications: reviewed on epic.   No outpatient prescriptions have been marked as taking for the 5/29/19 encounter (Office Visit) with Олег Camargo M.D..        Allergies:   Allergies   Allergen Reactions   • Gabapentin      Fatigue, depression.        Social Hx:   Social History     Social History   • Marital status:      Spouse name: N/A   • Number of children: N/A   • Years of education: N/A     Occupational History   • Not on file.     Social History Main Topics   • Smoking status: Never Smoker   • Smokeless tobacco: Never Used   • Alcohol use No   • Drug use: Yes      Comment: Marijuana, CBD for sleep   • Sexual activity: Yes     Partners: Female     Other Topics Concern   •  Service Yes   • Blood Transfusions No   • Caffeine Concern No   • Occupational Exposure No   • Hobby Hazards No   • Sleep Concern Yes   • Stress Concern Yes   • Weight Concern No   • Special Diet No   • Back Care No   • Exercise Yes   • Bike Helmet No   • Seat Belt Yes   • Self-Exams Yes     Social History Narrative   • No narrative on file         EXAMINATION     Physical Exam:   Vitals: /78 (BP Location: Right arm, Patient Position: Sitting, BP Cuff Size: Adult)   Pulse 91    "Temp 36.5 °C (97.7 °F) (Temporal)   Ht 1.88 m (6' 2\")   Wt 100 kg (220 lb 7.4 oz)   SpO2 100%     Constitutional:   Body Habitus: Body mass index is 28.31 kg/m².  Cooperation: Fully cooperates with exam  Appearance: Well-groomed, well-nourished, not disheveled     Eyes: No scleral icterus to suggest severe liver disease, no proptosis to suggest severe hyperthyroid    ENT -no obvious auditory deficits, no obvious tongue lesions, tongue midline, no facial droop     Skin -no rashes or lesions noted     Respiratory-  breathing comfortable on room air, no audible wheezing    Cardiovascular- capillary refills less than 2 seconds. No lower extremity edema is noted.     Gastrointestinal - no obvious abdominal masses, No tenderness to palpation in the abdomen    Psychiatric- alert and oriented ×3. Normal affect.     Gait - normal gait, no use of ambulatory device, nonantalgic. the patient can toe walk with ease. the patient can heel walk with ease. the patient can tandem walk with ease. balance is appropriate..     Musculoskeletal -   Thoracic/Lumbar Spine/Sacral Spine/Hips   Inspection: No evidence of atrophy in bilateral lower extremities throughout     ROM: full  AROM with flexion, extension, lateral flexion, and rotation bilaterally. Pain with lumbar extension and extension rotation. Pain with lumbar flexion.      Palpation:   No tenderness to palpation in midline at T1-T12 levels. No tenderness to palpation in the left and right of the midline T1-L5, NEGATIVE for tenderness to palpation to the para-midline region in the lower lumbar levels.  palpation over SI joint: negative bilaterally    palpation over buttock: negative bilaterally    palpation in hip or over the greater trochanters: negative bilaterally      Lumbar spine Special tests  Neuro tension  Straight leg test positive right, negative left    Slump test positive right, negative left      HIP  FAIR test positive right, negative left    Range of motion in " the hips is decreased BL right worse than left limits in flexion, extension, abduction, internal rotation, external rotation.    SI joint tests  Observation patient sits on one buttocks: Negative  SI joint compression negative bilaterally    SI joint distraction negative bilaterally    Thigh thrust test negative bilaterally    CHEO test negative bilaterally       Neuro       Key points for the international standards for neurological classification of spinal cord injury (ISNCSCI) to light touch.     Dermatome R L                                      L2 2 2   L3 2 2   L4 2 2   L5 2 2   S1 2 2   S2 2 2             Motor Exam Lower Extremities    ? Myotome R L   Hip flexion L2 5 5   Knee extension L3 5 5   Ankle dorsiflexion L4 5 5   Toe extension L5 5 5   Ankle plantarflexion S1 5 5       Babinski sign negative bilaterally   Clonus of the ankle negative bilaterally     Reflexes  ?  R L               Patella  2+ 2+   Achilles   2+ 2+           MEDICAL DECISION MAKING    Medical records review: see under HPI section.     DATA    Labs:   Lab Results   Component Value Date/Time    SODIUM 137 11/02/2018 07:49 PM    POTASSIUM 3.3 (L) 11/02/2018 07:49 PM    CHLORIDE 104 11/02/2018 07:49 PM    CO2 25 11/02/2018 07:49 PM    ANION 8.0 11/02/2018 07:49 PM    GLUCOSE 96 11/02/2018 07:49 PM    BUN 17 11/02/2018 07:49 PM    CREATININE 1.08 11/02/2018 07:49 PM    CALCIUM 8.7 11/02/2018 07:49 PM    ASTSGOT 35 11/02/2018 07:49 PM    ALTSGPT 37 11/02/2018 07:49 PM    TBILIRUBIN 0.8 11/02/2018 07:49 PM    ALBUMIN 4.4 11/02/2018 07:49 PM    TOTPROTEIN 7.4 11/02/2018 07:49 PM    GLOBULIN 3.0 11/02/2018 07:49 PM    AGRATIO 1.5 11/02/2018 07:49 PM   ]    No results found for: PROTHROMBTM, INR     Lab Results   Component Value Date/Time    WBC 5.3 11/02/2018 08:58 PM    RBC 5.65 11/02/2018 08:58 PM    HEMOGLOBIN 16.8 11/02/2018 08:58 PM    HEMATOCRIT 46.8 11/02/2018 08:58 PM    MCV 82.8 11/02/2018 08:58 PM    MCH 29.7 11/02/2018 08:58 PM     MCHC 35.9 (H) 11/02/2018 08:58 PM    MPV 9.6 11/02/2018 08:58 PM    NEUTSPOLYS 50.90 11/02/2018 08:58 PM    LYMPHOCYTES 36.50 11/02/2018 08:58 PM    MONOCYTES 11.60 11/02/2018 08:58 PM    EOSINOPHILS 0.20 11/02/2018 08:58 PM    BASOPHILS 0.40 11/02/2018 08:58 PM        No results found for: HBA1C     Imaging:   I personally reviewed following images, these are my reads  MRI lumbar spine 8/23/2018  Diffuse bilateral disc bulge with degenerative disc disease L4-5 resulting in moderate bilateral L4-5 neuroforaminal stenosis.  No significant central canal stenosis at any level.  L4 spondylolysis with no acute findings.  Bilateral facet arthropathy L4-5 and L5-S1 which is more advanced than expected for the patient's age.    IMAGING radiology reads. I reviewed the following radiology reads                                   Results for orders placed during the hospital encounter of 08/23/18   MR-CERVICAL SPINE-W/O    Impression 1.  C5-6 slight degenerative disc space narrowing and minimal right-sided disc/osteophyte change. No central or foraminal stenosis.  2.  C6-C7 minimal central disc bulging. No cord impingement or central stenosis. The neural foramina are intact.  3.  T3-T4 small central disc bulge or protrusion without central stenosis.  4.  No myelopathic cord signal abnormality at any cervical level.           Results for orders placed during the hospital encounter of 08/23/18   MR-LUMBAR SPINE-W/O    Impression 1.  L4-5 slight spondylolisthesis. This is more pronounced on the upright weightbearing lateral view plain films from 7/12/2018.  2.  Bilateral L4 spondylolysis.  3.  L4-5 mild disc bulging and pseudo-disc bulging. Lateral disc bulging along with mild facet hypertrophy results in mild left foraminal stenosis and moderate right foraminal stenosis.                           Results for orders placed during the hospital encounter of 08/14/18   DX-CERVICAL SPINE-4+ VIEWS    Impression 1.  No compression  deformity or acute fracture identified.    2.  No focal instability noted on flexion extension views.                                                Results for orders placed during the hospital encounter of 07/12/18   DX-LUMBAR SPINE-2 OR 3 VIEWS    Impression 1.  Grade 1 spondylolisthesis of L4 and L5 measuring 4.3 mm.    2.  Bilateral spondylolysis at L4 which is identified on prior CT examination of 11/4/2005.    3.  No compression fracture.    4.  Minimal midlung lumbar scoliosis convex left which may be secondary to positioning or spasm.      Results for orders placed during the hospital encounter of 08/14/18   DX-LUMBAR SPINE-4+ VIEWS    Impression 1.  No compression deformity or acute fracture is identified.    2.  There is L4 spondylolysis and grade 1 anterolisthesis at the L4-L5 level of the lumbar spine.    3.  No focal instability noted on flexion extension views.                                         Diagnosis   Visit Diagnoses     ICD-10-CM   1. Lumbar radiculopathy M54.16   2. Spondylolisthesis of lumbar region M43.16   3. Facet arthropathy, lumbar M47.816   4. Lumbar spondylosis M47.816   5. Femoroacetabular impingement of right hip M25.851           ASSESSMENT AND PLAN:  Kal Dhaval Cortes 29 y.o. male      Kal was seen today for new patient.    Diagnoses and all orders for this visit:    Lumbar radiculopathy  Comments:  Failed epidurals, patient not interested in back surgery at this time    Spondylolisthesis of lumbar region  Comments:  Stable, no instability on flexion-extension    Facet arthropathy, lumbar    Lumbar spondylosis  Comments:  Likely primary pain generator.  failed meds, PT, epidural. I scheduled the patient for medial branch blocks targeting the right L4-5 and L5-S1 facet joints.  Orders:  -     REFERRAL TO PHYSICIAL MEDICINE REHAB    Femoroacetabular impingement of right hip  Comments:  Imaging and exam positive for BLAISE.  Referred to Ortho for a second opinion  Orders:  -      REFERRAL TO ORTHOPEDICS      Outside records requested:  The patient signed outside records request form for his outside records including outside images.  Including the records from [facility] [place] [facility]      Follow-up: after the above procedure.       Please note that this dictation was created using voice recognition software. I have made every reasonable attempt to correct obvious errors but there may be errors of grammar and content that I may have overlooked prior to finalization of this note.      Олег Camargo MD  Physical Medicine and Rehabilitation  Interventional Spine and Sports Physiatry  [facility]                Cody Gardner* .

## 2019-05-29 NOTE — Clinical Note
Dear Marcus Aguilar M.D. ,   Thank you for the referral of Kal Cortes.  Please see my note for more details    Should you have any questions or concerns please do not hesitate to contact me.  Оелг Camargo M.D.

## 2019-05-29 NOTE — PATIENT INSTRUCTIONS
Your procedure will be at the [facility] special procedure suite.    [facility] [place] [address]       PRE-PROCEDURE INSTRUCTIONS  You may take your regular medications except:   · No Anti-inflammatories 5 days prior to your procedure. Anti-inflammatories include medicines such as  ibuprofen (Motrin, Advil), Excedrin, Naproxen (Aleve, Anaprox, Naprelan, Naprosyn), Celecoxib (Celebrex), Diclofenac (Voltaren-XR tab), and Meloxicam (Mobic).   · No Glucophage or Metformin 24 hours before your procedure. You may resume next day after your procedure.  · Call the physiatry office if you are taking or prescribed anti-biotics within five days of procedure.  · Please ask provider if you are taking any new diabetes medication.  · CONTINUE TAKING BLOOD PRESSURE MEDICATIONS AS PRESCRIBED.  · Pain medications will not be prescribed on the procedure day. Procedural pain medication may be used by your provider   · Call your doctor's office performing the procedure if you have a fever, chills, rash or new illness prior to your procedure    Anticoagulation/antiplatelet medications  No Blood thinning medications such as Coumadin or Plavix 5 days prior to procedure unless your doctor said to continue these medications. Call your doctor if a new medication is prescribed in this class.     Restrictions for eating before procedure:   · If you are getting procedural sedation, then do not eat to for 8 hours prior to procedure appointment time. Do not drink fluids for four hours prior to your procedure time.   · If you are not having procedural sedation, then Skip the meal prior to your procedure. If you have a morning procedure then skip breakfast. If you have an afternoon procedure then skip lunch.   · You may drink clear liquids up to 2 hours prior to your procedure  · You must have a  the day of procedure to accompany you home.      POST PROCEDURE INSTRUCTIONS   · No heavy lifting, strenuous bending or  strenuous exercise for 3 days after your procedure.  · No hot tubs, baths, swimming for 3 days after your procedure  · You can remove the bandage the day after the procedure.  · If you received a diagnostic procedure (such as a medial branch block), please note that we do expect this injection to wear off. It is important to complete the pain diary and list the pain score only for the region where the procedure was and bring this to your follow up visit.   · Your leg may feel heavy, weak and numb for up to 1-2 days. Be very careful walking.   ·  You may resume normal activities 3 days after procedure.

## 2019-05-29 NOTE — TELEPHONE ENCOUNTER
Sent a staff message to Dr. Miguel's office to place a referral for Orthopedics (Dr. Thao).  Spoke to his medical assistant too.    Thank you  Tiny

## 2019-05-31 NOTE — NON-PROVIDER
PAT note: PAT call made 05/31/2019 at 1350. Spoke with Kal. Health history, allergies, medications and pre-procedure instructions reviewed with patient.Pt verbalized understanding of instructions.

## 2019-06-03 ENCOUNTER — HOSPITAL ENCOUNTER (OUTPATIENT)
Dept: PAIN MANAGEMENT | Facility: REHABILITATION | Age: 29
End: 2019-06-03
Attending: PHYSICAL MEDICINE & REHABILITATION
Payer: COMMERCIAL

## 2019-06-03 ENCOUNTER — HOSPITAL ENCOUNTER (OUTPATIENT)
Dept: RADIOLOGY | Facility: REHABILITATION | Age: 29
End: 2019-06-03
Attending: PHYSICAL MEDICINE & REHABILITATION

## 2019-06-03 VITALS
RESPIRATION RATE: 16 BRPM | BODY MASS INDEX: 27.67 KG/M2 | DIASTOLIC BLOOD PRESSURE: 83 MMHG | HEART RATE: 72 BPM | SYSTOLIC BLOOD PRESSURE: 127 MMHG | HEIGHT: 74 IN | OXYGEN SATURATION: 98 % | TEMPERATURE: 97.7 F | WEIGHT: 215.61 LBS

## 2019-06-03 PROCEDURE — 700101 HCHG RX REV CODE 250

## 2019-06-03 PROCEDURE — 700111 HCHG RX REV CODE 636 W/ 250 OVERRIDE (IP)

## 2019-06-03 PROCEDURE — 64493 INJ PARAVERT F JNT L/S 1 LEV: CPT

## 2019-06-03 PROCEDURE — 700117 HCHG RX CONTRAST REV CODE 255

## 2019-06-03 PROCEDURE — 64494 INJ PARAVERT F JNT L/S 2 LEV: CPT

## 2019-06-03 RX ORDER — LIDOCAINE HYDROCHLORIDE 20 MG/ML
INJECTION, SOLUTION EPIDURAL; INFILTRATION; INTRACAUDAL; PERINEURAL
Status: COMPLETED
Start: 2019-06-03 | End: 2019-06-03

## 2019-06-03 RX ORDER — LIDOCAINE HYDROCHLORIDE 10 MG/ML
INJECTION, SOLUTION EPIDURAL; INFILTRATION; INTRACAUDAL; PERINEURAL
Status: COMPLETED
Start: 2019-06-03 | End: 2019-06-03

## 2019-06-03 RX ADMIN — LIDOCAINE HYDROCHLORIDE 10 ML: 10 INJECTION, SOLUTION EPIDURAL; INFILTRATION; INTRACAUDAL; PERINEURAL at 16:16

## 2019-06-03 RX ADMIN — IOHEXOL 5 ML: 240 INJECTION, SOLUTION INTRATHECAL; INTRAVASCULAR; INTRAVENOUS; ORAL at 16:16

## 2019-06-03 RX ADMIN — LIDOCAINE HYDROCHLORIDE 5 ML: 20 INJECTION, SOLUTION EPIDURAL; INFILTRATION; INTRACAUDAL; PERINEURAL at 16:17

## 2019-06-03 NOTE — NON-PROVIDER
Reviewed Plan of Care with patient. Confirmed that he has stopped all blood thinning medications for last 4 days. Confirmed that he has a . Reviewed home care instructions with patient prior to procedure. All questions and concerns addressed.   Dr Camargo aware.

## 2019-06-03 NOTE — PROCEDURES
Date of Service: 6/3/2019     Patient: Kal Cortes 29 y.o. male     MRN: [id number]     Physician/s: Олег Camargo MD    Pre-operative Diagnosis: Lumbosacral spondylosis, facet arthropathy    Post-operative Diagnosis: Lumbosacral spondylosis, facet arthropathy    Procedure: Medial Branch Blocks targeting the right L4-5 and L5-S1  facet joints     Description of procedure:    The risks, benefits, and alternatives of the procedure were reviewed and discussed with the patient.  Written informed consent was freely obtained. A pre-procedural time-out was conducted by the physician verifying patient’s identity, procedure to be performed, procedure site and side, and allergy verification. Appropriate equipment was determined to be in place for the procedure.       In the fluoroscopy suite the patient was placed in a prone position and the skin was prepped and draped in the usual sterile fashion. The fluoroscope was placed over the lower back at the appropriate angles, and the targets for injection were marked. A 27g needle was placed into each of the markings at the levels below, and approx 1cc of 1% Lidocaine was injected subcutaneously into the epidermal and dermal layers. The needle was removed intact.}      Using an oblique view, A 25g 3.5 inch needle was then placed at the intersection of the transverse process and superior articular process at the L5-S1 and S1 on the right side. The needle tips were then verified by AP, oblique, and lateral views.      Using an oblique view, A 25g 3.5 inch needle was then placed at the intersection of the transverse process and superior articular process at the L4-5 on the right side. The needle tips were then verified by AP, oblique, and lateral views.     In the AP view, less than 1 cc of contrast dye was used to highlight the medial branch while the fluoroscope was running live at the levels above. Following negative aspiration, 0.5cc of 2% lidocaine  was then injected  at the above levels, and the needles were removed intact after restyleted. The patient's back was covered with a 4x4 gauze, the area was cleansed with sterile normal saline, and a dressing was applied.     There were no complications noted, the patient was hemodynamically stable, and tolerated the procedure well.     The patient had 100% pain relief immediately post procedure of the index pain with bending, twisting and lumbar extension post procedure when compared to preprocedure.    Follow-up appointment is scheduled for 6/11/2019       Олег Camargo MD  Physical Medicine and Rehabilitation  Interventional Spine and Sports Physiatry  [facility]  6/3/2019  4:42 PM         CPT  Intraarticular joint or medial branch block (MBB) - lumbar or sacral (1st level):  07437  Intraarticular joint or medial branch block (MBB) - lumbar or sacral (2nd level):  11721

## 2019-06-03 NOTE — NON-PROVIDER
Dr Camargo in to re-assess patient. No pain. Able to ambulate without deficits. Instructed on use of pain diary.

## 2019-06-04 ENCOUNTER — TELEPHONE (OUTPATIENT)
Dept: PHYSICAL MEDICINE AND REHAB | Facility: MEDICAL CENTER | Age: 29
End: 2019-06-04

## 2019-06-04 NOTE — NON-PROVIDER
Pre procedure assessment complete, STOP BANG 1. Health hx and allergies reviewed with pt. Pt positioned pre-procedure by RN, CST, CNA, XRAY. Pillow placed under feet for support.

## 2019-06-04 NOTE — TELEPHONE ENCOUNTER
Called Pt and LM to call us back in regards to his S/P that was done 06/3/19 w/ Dr. Camargo, to check how he been doing.    Thank you  Tiny

## 2019-06-05 ENCOUNTER — TELEPHONE (OUTPATIENT)
Dept: MEDICAL GROUP | Age: 29
End: 2019-06-05

## 2019-06-05 NOTE — TELEPHONE ENCOUNTER
VOICEMAIL  1. Caller Name: Kal Cortes                      Call Back Number: [phone] (home)       2. Message: Pt left voicemail stating he was experiencing abdominal pain. Followed up with patient and appointment is set for 6/6/2019 at 10 am.     3. Patient approves office to leave a detailed voicemail/MyChart message: N\A

## 2019-06-06 ENCOUNTER — OFFICE VISIT (OUTPATIENT)
Dept: MEDICAL GROUP | Age: 29
End: 2019-06-06
Payer: COMMERCIAL

## 2019-06-06 VITALS
HEART RATE: 76 BPM | WEIGHT: 217 LBS | TEMPERATURE: 98 F | HEIGHT: 74 IN | DIASTOLIC BLOOD PRESSURE: 72 MMHG | BODY MASS INDEX: 27.85 KG/M2 | OXYGEN SATURATION: 95 % | SYSTOLIC BLOOD PRESSURE: 106 MMHG

## 2019-06-06 DIAGNOSIS — N50.819 TESTICULAR PAIN: ICD-10-CM

## 2019-06-06 DIAGNOSIS — M25.851 FEMOROACETABULAR IMPINGEMENT OF RIGHT HIP: ICD-10-CM

## 2019-06-06 DIAGNOSIS — M54.16 LUMBAR RADICULOPATHY: ICD-10-CM

## 2019-06-06 PROCEDURE — 99214 OFFICE O/P EST MOD 30 MIN: CPT | Performed by: FAMILY MEDICINE

## 2019-06-06 ASSESSMENT — PAIN SCALES - GENERAL: PAINLEVEL: 3=SLIGHT PAIN

## 2019-06-06 NOTE — PROGRESS NOTES
This medical record contains text that has been entered with the assistance of computer voice recognition and dictation software.  Therefore, it may contain unintended errors in text, spelling, punctuation, or grammar        Chief Complaint   Patient presents with   • Follow-Up     left testical pain radiating to lower abdomin x 6 weeks         Kal Cortes is a 29 y.o. male here evaluation and management of: Follow-up ultrasound, testicular pain, back pain, hip pain      HPI:     1. Testicular pain  The patient had been complaining of left testicular pain for the past 7-1/2 weeks now.  The pain is described as a sharp pain as if someone had flicked his testicle with the fingernails, he states that it will happen episodically for random reasons such as bending or even standing up.  Pain sometimes radiates into his abdomen, into the left inguinal region, improves with rest.  There is no changes in urination, no urethral discharge no fevers or.  Ultrasound was unremarkable besides willing a small varicocele as well as a small benign right testicular cyst.    Impression       No evidence of testicular torsion or mass.    Right epididymal head cyst measuring 1.33 cm.    Small left varicocele.   Reading Provider Reading Date   Yulia Schafer M.D. Apr 25, 2019   Signing Provider Signing Date Signing Time   Yulia Schafer M.D. Apr 25, 2019  2:34 PM   Lab Information     Lab   RADIOLOGY              Last Resulted Time   Thu Apr 25, 2019  2:37 PM         2. Femoroacetabular impingement of right hip  Overall the patient states that he has had significant improvement with the  steroid injection in our pain management clinic.  He will return there for further injections overall he is very pleased with the results.    3. Lumbar radiculopathy  Patient has undergone lumbar facet blocks and has had almost complete resolution of symptoms.  Denies any pain today, he is able to move around twist and turn his back  "without any issues.    Current medicines (including changes today)  Current Outpatient Prescriptions   Medication Sig Dispense Refill   • DULoxetine (CYMBALTA) 20 MG Cap DR Particles Take 1 Cap by mouth every day. 90 Cap 0   • Loratadine-Pseudoephedrine (CLARITIN-D 24 HOUR PO) Take  by mouth.       No current facility-administered medications for this visit.      He  has a past medical history of Chronic back pain.  He  has a past surgical history that includes appendectomy.  Social History   Substance Use Topics   • Smoking status: Never Smoker   • Smokeless tobacco: Never Used   • Alcohol use No     Social History     Social History Narrative   • No narrative on file     History reviewed. No pertinent family history.  Family Status   Relation Status   • Mo Alive   • Fa Alive         ROS    The pertinent  ROS findings can be seen in the HPI above.     All other systems reviewed and are negative     Objective:     /72 (BP Location: Left arm, Patient Position: Sitting, BP Cuff Size: Adult)   Pulse 76   Temp 36.7 °C (98 °F)   Ht 1.88 m (6' 2\")   Wt 98.4 kg (217 lb)   SpO2 95%  Body mass index is 27.86 kg/m².  Physical Exam:    Constitutional: Alert, no distress.  Skin: no rashes  Eye: Equal, round and reactive, conjunctiva clear, lids normal.  ENMT: Lips without lesions, good dentition, oropharynx clear.  Neck: Trachea midline, no masses, no thyromegaly. No cervical or supraclavicular lymphadenopathy.  Respiratory: Unlabored respiratory effort, lungs clear to auscultation, no wheezes, no ronchi.  Cardiovascular: Normal S1, S2, no murmur, no edema  Abdomen: Soft, non-tender, no masses, no hepatosplenomegaly.  Psych: Alert and oriented x3, normal affect and mood.  -Deffered        Assessment and Plan:   The following treatment plan was discussed      1. Testicular pain  Will proceed with urology referral  In the meantime the patient was educated on wearing a jock strap for  support  Using frozen peas to ice " the tender area  As well as using NSAIDs as needed    - REFERRAL TO UROLOGY    2. Femoroacetabular impingement of right hip  Appears to be improving with Dr. Camargo  Also headed to ortho for a second opinion    3. Lumbar radiculopathy  Patient has been stable with current management  We will make no changes for now          Instructed to Follow up in clinic or ER for worsening symptoms, difficulty breathing, lack of expected recovery, or should new symptoms or problems arise.    Followup: Return in about 3 months (around 9/6/2019) for Reevaluation.       Once again this medical record contains text that has been entered with the assistance of computer voice recognition and dictation software.  Therefore, it may contain unintended errors in text, spelling, punctuation, or grammar

## 2019-06-11 ENCOUNTER — APPOINTMENT (OUTPATIENT)
Dept: PHYSICAL MEDICINE AND REHAB | Facility: MEDICAL CENTER | Age: 29
End: 2019-06-11
Payer: COMMERCIAL

## 2019-07-02 ENCOUNTER — APPOINTMENT (OUTPATIENT)
Dept: ADMISSIONS | Facility: MEDICAL CENTER | Age: 29
End: 2019-07-02
Attending: ORTHOPAEDIC SURGERY
Payer: COMMERCIAL

## 2019-07-02 NOTE — OR NURSING
"Reviewed \"Preparing for your procedure\". Instructed to hold liquids 4 hours prior to surgery per orders.   "

## 2019-07-15 ENCOUNTER — ANESTHESIA (OUTPATIENT)
Dept: SURGERY | Facility: MEDICAL CENTER | Age: 29
End: 2019-07-15
Payer: COMMERCIAL

## 2019-07-15 ENCOUNTER — HOSPITAL ENCOUNTER (OUTPATIENT)
Facility: MEDICAL CENTER | Age: 29
End: 2019-07-15
Attending: ORTHOPAEDIC SURGERY | Admitting: ORTHOPAEDIC SURGERY
Payer: COMMERCIAL

## 2019-07-15 ENCOUNTER — APPOINTMENT (OUTPATIENT)
Dept: RADIOLOGY | Facility: MEDICAL CENTER | Age: 29
End: 2019-07-15
Attending: ORTHOPAEDIC SURGERY
Payer: COMMERCIAL

## 2019-07-15 ENCOUNTER — ANESTHESIA EVENT (OUTPATIENT)
Dept: SURGERY | Facility: MEDICAL CENTER | Age: 29
End: 2019-07-15
Payer: COMMERCIAL

## 2019-07-15 VITALS
OXYGEN SATURATION: 93 % | WEIGHT: 213.41 LBS | BODY MASS INDEX: 27.39 KG/M2 | HEART RATE: 78 BPM | HEIGHT: 74 IN | RESPIRATION RATE: 16 BRPM | TEMPERATURE: 97.2 F

## 2019-07-15 PROCEDURE — 160036 HCHG PACU - EA ADDL 30 MINS PHASE I: Performed by: ORTHOPAEDIC SURGERY

## 2019-07-15 PROCEDURE — 502581 HCHG PACK, SHOULDER ARTHROSCOPY: Performed by: ORTHOPAEDIC SURGERY

## 2019-07-15 PROCEDURE — 700101 HCHG RX REV CODE 250: Performed by: ANESTHESIOLOGY

## 2019-07-15 PROCEDURE — 160048 HCHG OR STATISTICAL LEVEL 1-5: Performed by: ORTHOPAEDIC SURGERY

## 2019-07-15 PROCEDURE — 700101 HCHG RX REV CODE 250: Performed by: ORTHOPAEDIC SURGERY

## 2019-07-15 PROCEDURE — 700111 HCHG RX REV CODE 636 W/ 250 OVERRIDE (IP): Performed by: ORTHOPAEDIC SURGERY

## 2019-07-15 PROCEDURE — 160042 HCHG SURGERY MINUTES - EA ADDL 1 MIN LEVEL 5: Performed by: ORTHOPAEDIC SURGERY

## 2019-07-15 PROCEDURE — 160025 RECOVERY II MINUTES (STATS): Performed by: ORTHOPAEDIC SURGERY

## 2019-07-15 PROCEDURE — 700105 HCHG RX REV CODE 258: Performed by: ORTHOPAEDIC SURGERY

## 2019-07-15 PROCEDURE — E0114 CRUTCH UNDERARM PAIR NO WOOD: HCPCS | Performed by: ORTHOPAEDIC SURGERY

## 2019-07-15 PROCEDURE — A4450 NON-WATERPROOF TAPE: HCPCS | Performed by: ORTHOPAEDIC SURGERY

## 2019-07-15 PROCEDURE — 160031 HCHG SURGERY MINUTES - 1ST 30 MINS LEVEL 5: Performed by: ORTHOPAEDIC SURGERY

## 2019-07-15 PROCEDURE — 501445 HCHG STAPLER, SKIN DISP: Performed by: ORTHOPAEDIC SURGERY

## 2019-07-15 PROCEDURE — 160002 HCHG RECOVERY MINUTES (STAT): Performed by: ORTHOPAEDIC SURGERY

## 2019-07-15 PROCEDURE — 700111 HCHG RX REV CODE 636 W/ 250 OVERRIDE (IP): Performed by: ANESTHESIOLOGY

## 2019-07-15 PROCEDURE — 160035 HCHG PACU - 1ST 60 MINS PHASE I: Performed by: ORTHOPAEDIC SURGERY

## 2019-07-15 PROCEDURE — 160009 HCHG ANES TIME/MIN: Performed by: ORTHOPAEDIC SURGERY

## 2019-07-15 PROCEDURE — 160046 HCHG PACU - 1ST 60 MINS PHASE II: Performed by: ORTHOPAEDIC SURGERY

## 2019-07-15 PROCEDURE — 73502 X-RAY EXAM HIP UNI 2-3 VIEWS: CPT | Mod: RT

## 2019-07-15 PROCEDURE — 501838 HCHG SUTURE GENERAL: Performed by: ORTHOPAEDIC SURGERY

## 2019-07-15 RX ORDER — HYDROMORPHONE HYDROCHLORIDE 1 MG/ML
0.4 INJECTION, SOLUTION INTRAMUSCULAR; INTRAVENOUS; SUBCUTANEOUS
Status: DISCONTINUED | OUTPATIENT
Start: 2019-07-15 | End: 2019-07-15 | Stop reason: HOSPADM

## 2019-07-15 RX ORDER — DEXAMETHASONE SODIUM PHOSPHATE 4 MG/ML
INJECTION, SOLUTION INTRA-ARTICULAR; INTRALESIONAL; INTRAMUSCULAR; INTRAVENOUS; SOFT TISSUE PRN
Status: DISCONTINUED | OUTPATIENT
Start: 2019-07-15 | End: 2019-07-15 | Stop reason: SURG

## 2019-07-15 RX ORDER — CEFAZOLIN SODIUM 1 G/3ML
INJECTION, POWDER, FOR SOLUTION INTRAMUSCULAR; INTRAVENOUS PRN
Status: DISCONTINUED | OUTPATIENT
Start: 2019-07-15 | End: 2019-07-15 | Stop reason: SURG

## 2019-07-15 RX ORDER — SODIUM CHLORIDE, SODIUM LACTATE, POTASSIUM CHLORIDE, CALCIUM CHLORIDE 600; 310; 30; 20 MG/100ML; MG/100ML; MG/100ML; MG/100ML
INJECTION, SOLUTION INTRAVENOUS CONTINUOUS
Status: DISCONTINUED | OUTPATIENT
Start: 2019-07-15 | End: 2019-07-15 | Stop reason: HOSPADM

## 2019-07-15 RX ORDER — IPRATROPIUM BROMIDE AND ALBUTEROL SULFATE 2.5; .5 MG/3ML; MG/3ML
3 SOLUTION RESPIRATORY (INHALATION)
Status: DISCONTINUED | OUTPATIENT
Start: 2019-07-15 | End: 2019-07-15 | Stop reason: HOSPADM

## 2019-07-15 RX ORDER — HALOPERIDOL 5 MG/ML
1 INJECTION INTRAMUSCULAR
Status: DISCONTINUED | OUTPATIENT
Start: 2019-07-15 | End: 2019-07-15 | Stop reason: HOSPADM

## 2019-07-15 RX ORDER — ONDANSETRON 2 MG/ML
4 INJECTION INTRAMUSCULAR; INTRAVENOUS
Status: DISCONTINUED | OUTPATIENT
Start: 2019-07-15 | End: 2019-07-15 | Stop reason: HOSPADM

## 2019-07-15 RX ORDER — ONDANSETRON 2 MG/ML
INJECTION INTRAMUSCULAR; INTRAVENOUS PRN
Status: DISCONTINUED | OUTPATIENT
Start: 2019-07-15 | End: 2019-07-15 | Stop reason: SURG

## 2019-07-15 RX ORDER — MEPERIDINE HYDROCHLORIDE 25 MG/ML
12.5 INJECTION INTRAMUSCULAR; INTRAVENOUS; SUBCUTANEOUS
Status: DISCONTINUED | OUTPATIENT
Start: 2019-07-15 | End: 2019-07-15 | Stop reason: HOSPADM

## 2019-07-15 RX ORDER — HYDROMORPHONE HYDROCHLORIDE 1 MG/ML
0.1 INJECTION, SOLUTION INTRAMUSCULAR; INTRAVENOUS; SUBCUTANEOUS
Status: DISCONTINUED | OUTPATIENT
Start: 2019-07-15 | End: 2019-07-15 | Stop reason: HOSPADM

## 2019-07-15 RX ORDER — HYDROMORPHONE HYDROCHLORIDE 1 MG/ML
0.2 INJECTION, SOLUTION INTRAMUSCULAR; INTRAVENOUS; SUBCUTANEOUS
Status: DISCONTINUED | OUTPATIENT
Start: 2019-07-15 | End: 2019-07-15 | Stop reason: HOSPADM

## 2019-07-15 RX ORDER — DIPHENHYDRAMINE HYDROCHLORIDE 50 MG/ML
12.5 INJECTION INTRAMUSCULAR; INTRAVENOUS
Status: DISCONTINUED | OUTPATIENT
Start: 2019-07-15 | End: 2019-07-15 | Stop reason: HOSPADM

## 2019-07-15 RX ORDER — HYDROMORPHONE HYDROCHLORIDE 2 MG/ML
INJECTION, SOLUTION INTRAMUSCULAR; INTRAVENOUS; SUBCUTANEOUS PRN
Status: DISCONTINUED | OUTPATIENT
Start: 2019-07-15 | End: 2019-07-15 | Stop reason: SURG

## 2019-07-15 RX ORDER — OXYCODONE HCL 5 MG/5 ML
5 SOLUTION, ORAL ORAL
Status: DISCONTINUED | OUTPATIENT
Start: 2019-07-15 | End: 2019-07-15 | Stop reason: HOSPADM

## 2019-07-15 RX ORDER — OXYCODONE HCL 5 MG/5 ML
10 SOLUTION, ORAL ORAL
Status: DISCONTINUED | OUTPATIENT
Start: 2019-07-15 | End: 2019-07-15 | Stop reason: HOSPADM

## 2019-07-15 RX ORDER — ROPIVACAINE HYDROCHLORIDE 5 MG/ML
INJECTION, SOLUTION EPIDURAL; INFILTRATION; PERINEURAL
Status: DISCONTINUED | OUTPATIENT
Start: 2019-07-15 | End: 2019-07-15 | Stop reason: HOSPADM

## 2019-07-15 RX ADMIN — PROPOFOL 50 MG: 10 INJECTION, EMULSION INTRAVENOUS at 09:00

## 2019-07-15 RX ADMIN — PROPOFOL 50 MG: 10 INJECTION, EMULSION INTRAVENOUS at 09:40

## 2019-07-15 RX ADMIN — ROCURONIUM BROMIDE 30 MG: 10 INJECTION, SOLUTION INTRAVENOUS at 08:55

## 2019-07-15 RX ADMIN — LIDOCAINE HYDROCHLORIDE 100 MG: 20 INJECTION, SOLUTION INFILTRATION; PERINEURAL at 08:31

## 2019-07-15 RX ADMIN — HYDROMORPHONE HYDROCHLORIDE 2 MG: 2 INJECTION, SOLUTION INTRAMUSCULAR; INTRAVENOUS; SUBCUTANEOUS at 08:35

## 2019-07-15 RX ADMIN — SODIUM CHLORIDE, POTASSIUM CHLORIDE, SODIUM LACTATE AND CALCIUM CHLORIDE: 600; 310; 30; 20 INJECTION, SOLUTION INTRAVENOUS at 08:28

## 2019-07-15 RX ADMIN — SUGAMMADEX 200 MG: 100 INJECTION, SOLUTION INTRAVENOUS at 09:52

## 2019-07-15 RX ADMIN — ROCURONIUM BROMIDE 50 MG: 10 INJECTION, SOLUTION INTRAVENOUS at 08:31

## 2019-07-15 RX ADMIN — PROPOFOL 200 MG: 10 INJECTION, EMULSION INTRAVENOUS at 08:31

## 2019-07-15 RX ADMIN — ONDANSETRON 4 MG: 2 INJECTION INTRAMUSCULAR; INTRAVENOUS at 08:28

## 2019-07-15 RX ADMIN — CEFAZOLIN 2 G: 1 INJECTION, POWDER, FOR SOLUTION INTRAVENOUS at 08:28

## 2019-07-15 RX ADMIN — SODIUM CHLORIDE, POTASSIUM CHLORIDE, SODIUM LACTATE AND CALCIUM CHLORIDE: 600; 310; 30; 20 INJECTION, SOLUTION INTRAVENOUS at 08:21

## 2019-07-15 RX ADMIN — DEXAMETHASONE SODIUM PHOSPHATE 4 MG: 4 INJECTION, SOLUTION INTRAMUSCULAR; INTRAVENOUS at 08:28

## 2019-07-15 RX ADMIN — LIDOCAINE HYDROCHLORIDE 0.5 ML: 10 INJECTION, SOLUTION INFILTRATION; PERINEURAL at 08:21

## 2019-07-15 ASSESSMENT — PAIN SCALES - GENERAL: PAIN_LEVEL: 0

## 2019-07-15 NOTE — ANESTHESIA TIME REPORT
Anesthesia Start and Stop Event Times     Date Time Event    7/15/2019 0828 Anesthesia Start     1018 Anesthesia Stop        Responsible Staff  07/15/19    Name Role Begin End    Jesus Guerra M.D. Anesth 0828 1018        Preop Diagnosis (Free Text):  Pre-op Diagnosis     Right Hip Femoral Acetabular Impingement        Preop Diagnosis (Codes):  Diagnosis Information     Diagnosis Code(s):         Post op Diagnosis  Hip impingement syndrome, right      Premium Reason  Non-Premium    Comments:

## 2019-07-15 NOTE — ANESTHESIA PREPROCEDURE EVALUATION
Right hip impingement and pain. Low back pain. Otherwise healthy.     Relevant Problems   No relevant active problems       Physical Exam    Airway   Mallampati: II  TM distance: >3 FB  Neck ROM: full       Cardiovascular - normal exam  Rhythm: regular  Rate: normal  (-) murmur     Dental - normal exam         Pulmonary - normal exam  Breath sounds clear to auscultation     Abdominal    Neurological - normal exam                 Anesthesia Plan    ASA 2       Plan - general       Airway plan will be LMA        Induction: intravenous    Postoperative Plan: Postoperative administration of opioids is intended.    Pertinent diagnostic labs and testing reviewed    Informed Consent:    Anesthetic plan and risks discussed with patient.    Use of blood products discussed with: patient whom consented to blood products.

## 2019-07-15 NOTE — DISCHARGE INSTRUCTIONS
ACTIVITY: Rest and take it easy for the first 24 hours.  A responsible adult is recommended to remain with you during that time.  It is normal to feel sleepy.  We encourage you to not do anything that requires balance, judgment or coordination.    MILD FLU-LIKE SYMPTOMS ARE NORMAL. YOU MAY EXPERIENCE GENERALIZED MUSCLE ACHES, THROAT IRRITATION, HEADACHE AND/OR SOME NAUSEA.    FOR 24 HOURS DO NOT:  Drive, operate machinery or run household appliances.  Drink beer or alcoholic beverages.   Make important decisions or sign legal documents.    SPECIAL INSTRUCTIONS: 1. DC home   2. DC home on home meds   3. DC home on [place], ASA, Meloxicam   4. Outpatient PT to begin in 2-4 days.   5.  Follow up [facility] 10-14 days   6. Call for Fevers, drainage, redness, shortness of breath, or increasing extremity swelling particularly in the calf.   7. Start Aspirin 325mg per day. Use for thirty days.   8. Remove bandage in 5 days.  Replace sooner for drainage and notify office.   9.  Wear ABIGAIL (compressive stocking) for 2 weeks on both lower extremities.   10.. 75% Weightbearing on surgical leg with crutches.***    DIET: To avoid nausea, slowly advance diet as tolerated, avoiding spicy or greasy foods for the first day.  Add more substantial food to your diet according to your physician's instructions.  Babies can be fed formula or breast milk as soon as they are hungry.  INCREASE FLUIDS AND FIBER TO AVOID CONSTIPATION.    SURGICAL DRESSING/BATHING: *Keep dressing clean and dry for 5 days. May shower with incision covered.**      You should CALL YOUR PHYSICIAN if you develop:  Fever greater than 101 degrees F.  Pain not relieved by medication, or persistent nausea or vomiting.  Excessive bleeding (blood soaking through dressing) or unexpected drainage from the wound.  Extreme redness or swelling around the incision site, drainage of pus or foul smelling drainage.  Inability to urinate or empty your bladder within 8  hours.  Problems with breathing or chest pain.    You should call 911 if you develop problems with breathing or chest pain.  If you are unable to contact your doctor or surgical center, you should go to the nearest emergency room or urgent care center.  Physician's telephone #: *[phone]**    If any questions arise, call your doctor.  If your doctor is not available, please feel free to call the Surgical Center at [phone].  The Center is open Monday through Friday from 7AM to 7PM.  You can also call the HEALTH HOTLINE open 24 hours/day, 7 days/week and speak to a nurse at [phone], or toll free at [phone].    A registered nurse may call you a few days after your surgery to see how you are doing after your procedure.    MEDICATIONS: Resume taking daily medication.  Take prescribed pain medication with food.  If no medication is prescribed, you may take non-aspirin pain medication if needed.  PAIN MEDICATION CAN BE VERY CONSTIPATING.  Take a stool softener or laxative such as senokot, pericolace, or milk of magnesia if needed.    Prescription given for **previously given*.  Last pain medication given at *none in PACU.    If your physician has prescribed pain medication that includes Acetaminophen (Tylenol), do not take additional Acetaminophen (Tylenol) while taking the prescribed medication.    Depression / Suicide Risk    As you are discharged from this [facility] facility, it is important to learn how to keep safe from harming yourself.    Recognize the warning signs:  · Abrupt changes in personality, positive or negative- including increase in energy   · Giving away possessions  · Change in eating patterns- significant weight changes-  positive or negative  · Change in sleeping patterns- unable to sleep or sleeping all the time   · Unwillingness or inability to communicate  · Depression  · Unusual sadness, discouragement and loneliness  · Talk of wanting to die  · Neglect of personal  appearance   · Rebelliousness- reckless behavior  · Withdrawal from people/activities they love  · Confusion- inability to concentrate     If you or a loved one observes any of these behaviors or has concerns about self-harm, here's what you can do:  · Talk about it- your feelings and reasons for harming yourself  · Remove any means that you might use to hurt yourself (examples: pills, rope, extension cords, firearm)  · Get professional help from the community (Mental Health, Substance Abuse, psychological counseling)  · Do not be alone:Call your Safe Contact- someone whom you trust who will be there for you.  · Call your local CRISIS HOTLINE 653-8366 or 495-831-1488  · Call your local Children's Mobile Crisis Response Team [place] (042) 461-0507 or www.[place]  · Call the toll free National Suicide Prevention Hotlines   · National Suicide Prevention Lifeline 294-388-DMIW (3649)  · National Hope Line Network 800-SUICIDE (973-9473)

## 2019-07-15 NOTE — OP REPORT
DATE OF OPERATION: 7/15/2019    PREOPERATIVE DIAGNOSES:   1. Right hip pain.   2. Right hip Femoral Acetabular Impingement    POSTOPERATIVE DIAGNOSES:   1. Right hip pain.   2. Right hip Femoral Acetabular Impingement impingement.     PROCEDURES:   1. Right hip arthroscopic debridement and chondroplasty.   2. Right hip arthroscopic peripheral compartment synovectomy.   3. Right hip femoral neck osteoplasty.        SURGEON: Peter Thao MD.   ASSISTANT: Rubia Garcia    ANESTHESIA: General  ANESTHESIOLOGIST:Jesus Guerra M.D.     COMPLICATIONS: None.   WEIGHTBEARIN%.     FINDINGS:   1. Stable femoral osteoplasty.   2.  Mild labral fraying with stable chondroplasty and Oratec treatment    PROCEDURE IN DETAIL: The patient was taken to the operating room and then underwent a general anesthetic in the supine position. Bilateral legs placed   into traction on the [place] table with the right first, then the left, traction   post eccentric into the right thigh.  The right leg was addressed with   Hibiclens, alcohol and ChloraPrep, then the field draped using sterile technique. A timeout was undertaken and noted. The surgery was initiated with gentle distraction of   the right hip. Placement of a pertrochanteric guidewire followed by an   anterolateral under direct visualization. Diagnostic arthroscopy was   Undertaken. The traction was progressively let off, and the head-neck osteoplasty undertaken starting first posterolaterally and then laterally and then anterolaterally.     As traction was let off, a distal anterolateral portal was made. The cannulated guide system was utilized to obtain access to the peripheral compartment. Blunt dissection was undertaken. Blade capsular incision followed with utilization of the shaver to perform a peripheral compartment synovectomy.     The motorized matt was then utilized to perform a femoral neck osteoplasty anterior, anterolateral and lateral. A stable decompression was  accomplished under direct and fluoroscopic visualization and range of motion.   Final images were obtained, a stable decompression noted. Thorough   irrigation followed with periarticular anesthetization with ropivacaine and   closure of the incisions with nylon. The sterile bandage was applied. The   patient awoken from her anesthetic and taken to the recovery room, tolerated   the procedure very well with no signs of complications.

## 2019-07-15 NOTE — ANESTHESIA PROCEDURE NOTES
Airway  Date/Time: 7/15/2019 8:34 AM  Performed by: BHANU ANGELES  Authorized by: BHANU ANGELES     Location:  OR  Urgency:  Elective  Indications for Airway Management:  Anesthesia  Spontaneous Ventilation: absent    Sedation Level:  Deep  Preoxygenated: Yes    Mask Difficulty Assessment:  0 - not attempted  Final Airway Type:  Supraglottic airway  Final Supraglottic Airway:  Standard LMA  SGA Size:  4  Number of Attempts at Approach:  1

## 2019-07-15 NOTE — OR NURSING
Patient to preop, allergies and NPO status verified, home medications reconciled, belongings secured, verbalizes understanding of pain scale, surgical site verified, IV access established, SCDs/ ABIGAIL hose in place, triple aim completed.

## 2019-07-15 NOTE — OR NURSING
1125 Pt dressed with min assist. Able to bear 75% weight. Coffee provided. Pt denies pain, nausea and discomfort. 1140 Crutch training discussed with return demonstration. All discharge instructions discussed and questions answered. 1145 Pt meets criteria for discharge. Pt remains pain and nausea free, drinking coffee.

## 2019-07-15 NOTE — ANESTHESIA QCDR
2019 [facility] Clinical Data Registry (for Quality Improvement)     Postoperative nausea/vomiting risk protocol (Adult = 18 yrs and Pediatric 3-17 yrs)- (430 and 463)  General inhalation anesthetic (NOT TIVA) with PONV risk factors: Yes  Provision of anti-emetic therapy with at least 2 different classes of agents: Yes   Patient DID NOT receive anti-emetic therapy and reason is documented in Medical Record:  N/A    Multimodal Pain Management- (AQI59)  Patient undergoing Elective Surgery (i.e. Outpatient, or ASC, or Prescheduled Surgery prior to Hospital Admission): Yes  Use of Multimodal Pain Management, two or more drugs and/or interventions, NOT including systemic opioids: Yes   Exception: Documented allergy to multiple classes of analgesics:  N/A    PACU assessment of acute postoperative pain prior to Anesthesia Care End- Applies to Patients Age = 18- (ABG7)  Initial PACU pain score is which of the following: < 7/10  Patient unable to report pain score: N/A    Post-anesthetic transfer of care checklist/protocol to PACU/ICU- (426 and 427)  Upon conclusion of case, patient transferred to which of the following locations: PACU/Non-ICU  Use of transfer checklist/protocol: Yes  Exclusion: Service Performed in Patient Hospital Room (and thus did not require transfer): N/A    PACU Reintubation- (AQI31)  General anesthesia requiring endotracheal intubation (ETT) along with subsequent extubation in OR or PACU: No  Required reintubation in the PACU: N/A  Extubation was a planned trial documented in the medical record prior to removal of the original airway device: N/A    Unplanned admission to ICU related to anesthesia service up through end of PACU care- (MD51)  Unplanned admission to ICU (not initially anticipated at anesthesia start time): No

## 2019-07-15 NOTE — ANESTHESIA POSTPROCEDURE EVALUATION
Patient: Kal Cortes    Procedure Summary     Date:  07/15/19 Room / Location:   OR  / SURGERY [facility]    Anesthesia Start:  0828 Anesthesia Stop:  1018    Procedures:       ARTHROSCOPY, HIP (Right Hip)      DEBRIDEMENT (Right Hip)      SYNOVECTOMY (Right Hip)      OSTEOPLASTY, FEMUR, NECK   (Right Hip) Diagnosis:  (Right Hip Femoral Acetabular Impingement)    Surgeon:  Peter Thao M.D. Responsible Provider:  Jesus Guerra M.D.    Anesthesia Type:  general ASA Status:  2          Final Anesthesia Type: general  Last vitals  BP   NIBP: 115/51    Temp   36.4 °C (97.5 °F)    Pulse   Pulse: 70, Heart Rate (Monitored): 74   Resp   12    SpO2   95 %      Anesthesia Post Evaluation    Patient location during evaluation: PACU  Patient participation: complete - patient participated  Level of consciousness: awake and alert  Pain score: 0    Airway patency: patent  Anesthetic complications: no  Cardiovascular status: hemodynamically stable  Respiratory status: acceptable  Hydration status: euvolemic    PONV: none           Nurse Pain Score: 0 (NPRS)

## 2019-10-30 ENCOUNTER — OFFICE VISIT (OUTPATIENT)
Dept: URGENT CARE | Facility: CLINIC | Age: 29
End: 2019-10-30
Payer: COMMERCIAL

## 2019-10-30 ENCOUNTER — HOSPITAL ENCOUNTER (OUTPATIENT)
Dept: RADIOLOGY | Facility: MEDICAL CENTER | Age: 29
End: 2019-10-30
Attending: PHYSICIAN ASSISTANT
Payer: COMMERCIAL

## 2019-10-30 VITALS
RESPIRATION RATE: 16 BRPM | OXYGEN SATURATION: 98 % | HEIGHT: 74 IN | TEMPERATURE: 98.6 F | WEIGHT: 220 LBS | BODY MASS INDEX: 28.23 KG/M2 | SYSTOLIC BLOOD PRESSURE: 106 MMHG | DIASTOLIC BLOOD PRESSURE: 60 MMHG | HEART RATE: 70 BPM

## 2019-10-30 DIAGNOSIS — R19.8 ABDOMINAL FULLNESS: ICD-10-CM

## 2019-10-30 PROCEDURE — 99213 OFFICE O/P EST LOW 20 MIN: CPT | Performed by: PHYSICIAN ASSISTANT

## 2019-10-30 PROCEDURE — 76705 ECHO EXAM OF ABDOMEN: CPT

## 2019-10-30 ASSESSMENT — ENCOUNTER SYMPTOMS
BLOOD IN STOOL: 0
CONSTIPATION: 0
FEVER: 0
DIARRHEA: 0
ABDOMINAL PAIN: 1
ANOREXIA: 0

## 2019-10-30 NOTE — PROGRESS NOTES
Subjective:   Kal Cortes is a 29 y.o. male who presents today with   Chief Complaint   Patient presents with   • Groin Pain     LEFT x4 months       LLQ Pain   This is a new problem. The current episode started more than 1 month ago. The onset quality is gradual. The problem occurs intermittently. The problem has been unchanged. The pain is located in the LLQ. The pain is mild. The quality of the pain is dull and a sensation of fullness. Pertinent negatives include no anorexia, constipation, diarrhea or fever.     Patient notes increased sensation of fullness and pain in his left lower quadrant in the mornings when he is showering.  He denies any mass or lump felt in the left scrotum.  He states that he was seen by [facility] and they performed a scrotal ultrasound at that time which was negative for any hernia.  He states he has had a palpable lump in the left lower quadrant which he has noticed over the past couple months.  PMH:  has a past medical history of Chronic back pain, Heart burn, Indigestion, and Pain (07/02/2019).  MEDS:   Current Outpatient Medications:   •  diphenhydrAMINE-APAP, sleep, (TYLENOL PM EXTRA STRENGTH PO), Take  by mouth., Disp: , Rfl:   •  Loratadine-Pseudoephedrine (CLARITIN-D 24 HOUR PO), Take  by mouth., Disp: , Rfl:   •  DULoxetine (CYMBALTA) 20 MG Cap DR Particles, Take 1 Cap by mouth every day., Disp: 90 Cap, Rfl: 0  ALLERGIES:   Allergies   Allergen Reactions   • Gabapentin      Fatigue, depression.      SURGHX:   Past Surgical History:   Procedure Laterality Date   • PB HIP SCOPE/REMV BODY,PLASTY/RESECTN Right 7/15/2019    Procedure: DEBRIDEMENT;  Surgeon: Peter Thao M.D.;  Location: [facility];  Service: Orthopedics   • HIP ARTHROSCOPY Right 7/15/2019    Procedure: ARTHROSCOPY, HIP;  Surgeon: Peter Thao M.D.;  Location: [facility];  Service: Orthopedics   • SYNOVECTOMY Right 7/15/2019    Procedure: SYNOVECTOMY;  Surgeon:  "Peter Thao M.D.;  Location: SURGERY [facility];  Service: Orthopedics   • FEMORAL NECK OSTEOPLASTY Right 7/15/2019    Procedure: OSTEOPLASTY, FEMUR, NECK  ;  Surgeon: Peter Thao M.D.;  Location: SURGERY [facility];  Service: Orthopedics   • BLOCK SELECTIVE NERVE  06/2019    L4/L5   • APPENDECTOMY       SOCHX:  reports that he has never smoked. He has never used smokeless tobacco. He reports that he drinks alcohol. He reports that he does not use drugs.  FH: Reviewed with patient, not pertinent to this visit.       Review of Systems   Constitutional: Negative for fever.   Gastrointestinal: Positive for abdominal pain. Negative for anorexia, blood in stool, constipation and diarrhea.        Objective:   /60 (BP Location: Right arm, Patient Position: Sitting, BP Cuff Size: Adult)   Pulse 70   Temp 37 °C (98.6 °F) (Temporal)   Resp 16   Ht 1.88 m (6' 2\")   Wt 99.8 kg (220 lb)   SpO2 98%   BMI 28.25 kg/m²   Physical Exam   Constitutional: Vital signs are normal. He appears well-developed and well-nourished. No distress.   HENT:   Head: Normocephalic and atraumatic.   Right Ear: Hearing normal.   Left Ear: Hearing normal.   Eyes: Pupils are equal, round, and reactive to light.   Cardiovascular: Normal rate, regular rhythm and normal heart sounds.   Pulmonary/Chest: Effort normal.   Abdominal: There is tenderness in the left lower quadrant. A hernia is present.       Small lump palpated in LLQ   Musculoskeletal:   Normal movement in all 4 extremities   Neurological: He is alert. Coordination normal.   Skin: Skin is warm and dry.   Psychiatric: He has a normal mood and affect.   Nursing note and vitals reviewed.  US HERNIA ABDOMEN  FINDINGS:  Targeted ultrasound evaluation of the area of concern in the left lower quadrant does not demonstrate a hernia. No mass or fluid collection identified.      Impression       No evidence of left lower quadrant hernia.       Assessment/Plan: "   Assessment    1. Abdominal fullness  Called and discussed ultrasound results with patient.  He is understanding at this time.  He will continue to monitor the symptoms and follow-up with his primary care if they persist.  Differential diagnosis, natural history, supportive care, and indications for immediate follow-up discussed.   Patient given instructions and understanding of medications and treatment.      Patient agreeable to plan.      Please note that this dictation was created using voice recognition software. I have made every reasonable attempt to correct obvious errors, but I expect that there are errors of grammar and possibly content that I did not discover before finalizing the note.    Yariel Garcia PA-C

## 2020-03-16 ENCOUNTER — PATIENT MESSAGE (OUTPATIENT)
Dept: MEDICAL GROUP | Age: 30
End: 2020-03-16

## 2020-03-16 DIAGNOSIS — L98.9 SKIN LESION: ICD-10-CM

## 2020-03-16 NOTE — PATIENT COMMUNICATION
2. SPECIFIC Action To Be Taken: Referral pending, please sign.    3. Diagnosis/Clinical Reason for Request: Mole    4. Specialty & Provider Name/Lab/Imaging Location: Derm    5. Is appointment scheduled for requested order/referral: no    Patient was informed they will receive a return phone call from the office ONLY if there are any questions before processing their request. Advised to call back if they haven't received a call from the referral department in 5 days.

## 2020-03-18 DIAGNOSIS — D48.9 NEOPLASM OF UNCERTAIN BEHAVIOR: ICD-10-CM

## (undated) DEVICE — TOWELS CLOTH SURGICAL - (4/PK 20PK/CA)

## (undated) DEVICE — PROBE VULCAN INTEGRA ABLATOR - E-FLEX

## (undated) DEVICE — MASK ANESTHESIA ADULT  - (100/CA)

## (undated) DEVICE — GOWN SURGICAL X-LARGE ULTRA - FILM-REINFORCED (20/CA)

## (undated) DEVICE — GLOVE BIOGEL INDICATOR SZ 6.5 SURGICAL PF LTX - (50PR/BX 4BX/CA)

## (undated) DEVICE — SUCTION INSTRUMENT YANKAUER BULBOUS TIP W/O VENT (50EA/CA)

## (undated) DEVICE — GLOVE PROTEXIS LATEX SIZE 9 (40PR/BX)

## (undated) DEVICE — SET EXTENSION WITH 2 PORTS (48EA/CA) ***PART #2C8610 IS A SUBSTITUTE*****

## (undated) DEVICE — TUBING PATIENT W/CONNECTOR REDEUCE (1EA)

## (undated) DEVICE — KIT ANESTHESIA W/CIRCUIT & 3/LT BAG W/FILTER (20EA/CA)

## (undated) DEVICE — TRAY SRGPRP PVP IOD WT PRP - (20/CA)

## (undated) DEVICE — GLOVE BIOGEL PI ULTRATOUCH SZ 7.0 SURGICAL PF LF- POWDER FREE (50/BX 4BX/CA)

## (undated) DEVICE — DRAPE LARGE 3 QUARTER - (20/CA)

## (undated) DEVICE — SUTURE 3-0 ETHILON FS-1 - (36/BX) 30 INCH

## (undated) DEVICE — MASK, LARYNGEAL AIRWAY #4

## (undated) DEVICE — KIT ROOM DECONTAMINATION

## (undated) DEVICE — CANISTER SUCTION RIGID RED 1500CC (40EA/CA)

## (undated) DEVICE — TUBING CLEARLINK DUO-VENT - C-FLO (48EA/CA)

## (undated) DEVICE — TUBE CONNECTING SUCTION - CLEAR PLASTIC STERILE 72 IN (50EA/CA)

## (undated) DEVICE — HEAD HOLDER JUNIOR/ADULT

## (undated) DEVICE — GLOVE, LITE (PAIR)

## (undated) DEVICE — TAPE CLOTH MEDIPORE 6 INCH - (12RL/CA)

## (undated) DEVICE — SENSOR SPO2 NEO LNCS ADHESIVE (20/BX) SEE USER NOTES

## (undated) DEVICE — BUR HIP AGGRESSIVE PLUS STRIGHT LONG 5.0MM (5EA/BX)

## (undated) DEVICE — PACK SHOULDER ARTHROSCOPY SM - (2EA/CA)

## (undated) DEVICE — GOWN WARMING STANDARD FLEX - (30/CA)

## (undated) DEVICE — PROTECTOR ULNA NERVE - (36PR/CA)

## (undated) DEVICE — STAPLER SKIN DISP - (6/BX 10BX/CA) VISISTAT

## (undated) DEVICE — HUMID-VENT HEAT AND MOISTURE EXCHANGE- (50/BX)

## (undated) DEVICE — SHAVER HIP 8 FLUTE XL 5.5MM (5EA/BX)

## (undated) DEVICE — NEPTUNE 4 PORT MANIFOLD - (20/PK)

## (undated) DEVICE — ELECTRODE DUAL RETURN W/ CORD - (50/PK)

## (undated) DEVICE — DRAPE VERTICAL ISOLATION - (10EA/CA)

## (undated) DEVICE — GLOVE BIOGEL SZ 6.5 SURGICAL PF LTX (50PR/BX 4BX/CA)

## (undated) DEVICE — ELECTRODE 850 FOAM ADHESIVE - HYDROGEL RADIOTRNSPRNT (50/PK)

## (undated) DEVICE — LACTATED RINGERS INJ 1000 ML - (14EA/CA 60CA/PF)

## (undated) DEVICE — SODIUM CHL. IRRIGATION 0.9% 3000ML (4EA/CA 65CA/PF)

## (undated) DEVICE — GLOVE BIOGEL PI INDICATOR SZ 7.0 SURGICAL PF LF - (50/BX 4BX/CA)

## (undated) DEVICE — SUTURE GENERAL

## (undated) DEVICE — TUBING PUMP WITH CONNECTOR REDEUCE (1EA)

## (undated) DEVICE — DRAPE C-ARM LARGE 41IN X 74 IN - (10/BX 2BX/CA)

## (undated) DEVICE — GLOVE PROTEXIS LATEX MICRO SIZE 9 (50PR/BX)

## (undated) DEVICE — DRAPE STRLE REG TOWEL 18X24 - (10/BX 4BX/CA)"

## (undated) DEVICE — DRESSING INTEGUSEAL MICROBIAL SEALANT IS100 (20EA/CA)

## (undated) DEVICE — CHLORAPREP 26 ML APPLICATOR - ORANGE TINT(25/CA)

## (undated) DEVICE — FORMULA HIP RESECTOR XL (5EA/BX)